# Patient Record
Sex: FEMALE | Race: WHITE | NOT HISPANIC OR LATINO | Employment: FULL TIME | URBAN - METROPOLITAN AREA
[De-identification: names, ages, dates, MRNs, and addresses within clinical notes are randomized per-mention and may not be internally consistent; named-entity substitution may affect disease eponyms.]

---

## 2018-12-26 ENCOUNTER — APPOINTMENT (EMERGENCY)
Dept: RADIOLOGY | Facility: HOSPITAL | Age: 53
DRG: 720 | End: 2018-12-26
Payer: COMMERCIAL

## 2018-12-26 ENCOUNTER — HOSPITAL ENCOUNTER (INPATIENT)
Facility: HOSPITAL | Age: 53
LOS: 2 days | Discharge: HOME | DRG: 720 | End: 2018-12-28
Attending: EMERGENCY MEDICINE | Admitting: STUDENT IN AN ORGANIZED HEALTH CARE EDUCATION/TRAINING PROGRAM
Payer: COMMERCIAL

## 2018-12-26 DIAGNOSIS — A41.9 SEPSIS, DUE TO UNSPECIFIED ORGANISM: ICD-10-CM

## 2018-12-26 DIAGNOSIS — J18.9 RIGHT LOWER LOBE PNEUMONIA: Primary | ICD-10-CM

## 2018-12-26 DIAGNOSIS — J18.9 COMMUNITY ACQUIRED PNEUMONIA OF RIGHT LOWER LOBE OF LUNG: ICD-10-CM

## 2018-12-26 PROBLEM — F41.9 ANXIETY: Status: ACTIVE | Noted: 2018-12-26

## 2018-12-26 PROBLEM — R10.9 ABDOMINAL PAIN: Status: ACTIVE | Noted: 2018-12-26

## 2018-12-26 LAB
ALBUMIN SERPL BCP-MCNC: 4 G/DL (ref 3.5–5)
ALP SERPL-CCNC: 118 U/L (ref 46–116)
ALT SERPL W P-5'-P-CCNC: 62 U/L (ref 12–78)
ANION GAP SERPL CALCULATED.3IONS-SCNC: 9 MMOL/L (ref 4–13)
APTT PPP: 24 SECONDS (ref 26–38)
AST SERPL W P-5'-P-CCNC: 43 U/L (ref 5–45)
ATRIAL RATE: 115 BPM
BACTERIA UR QL AUTO: ABNORMAL /HPF
BASOPHILS # BLD AUTO: 0.03 THOUSANDS/ΜL (ref 0–0.1)
BASOPHILS NFR BLD AUTO: 0 % (ref 0–1)
BILIRUB SERPL-MCNC: 0.6 MG/DL (ref 0.2–1)
BILIRUB UR QL STRIP: NEGATIVE
BUN SERPL-MCNC: 14 MG/DL (ref 5–25)
CALCIUM SERPL-MCNC: 9.4 MG/DL (ref 8.3–10.1)
CHLORIDE SERPL-SCNC: 103 MMOL/L (ref 100–108)
CLARITY UR: CLEAR
CO2 SERPL-SCNC: 29 MMOL/L (ref 21–32)
COLOR UR: YELLOW
CREAT SERPL-MCNC: 0.92 MG/DL (ref 0.6–1.3)
EOSINOPHIL # BLD AUTO: 0 THOUSAND/ΜL (ref 0–0.61)
EOSINOPHIL NFR BLD AUTO: 0 % (ref 0–6)
ERYTHROCYTE [DISTWIDTH] IN BLOOD BY AUTOMATED COUNT: 12.5 % (ref 11.6–15.1)
FLUAV AG SPEC QL IA: NORMAL
FLUAV AG SPEC QL: NORMAL
FLUBV AG SPEC QL IA: NORMAL
FLUBV AG SPEC QL: NORMAL
GFR SERPL CREATININE-BSD FRML MDRD: 71 ML/MIN/1.73SQ M
GLUCOSE SERPL-MCNC: 130 MG/DL (ref 65–140)
GLUCOSE UR STRIP-MCNC: NEGATIVE MG/DL
HCT VFR BLD AUTO: 47.6 % (ref 34.8–46.1)
HGB BLD-MCNC: 15.2 G/DL (ref 11.5–15.4)
HGB UR QL STRIP.AUTO: ABNORMAL
IMM GRANULOCYTES # BLD AUTO: 0.03 THOUSAND/UL (ref 0–0.2)
IMM GRANULOCYTES NFR BLD AUTO: 0 % (ref 0–2)
INR PPP: 0.97 (ref 0.86–1.16)
KETONES UR STRIP-MCNC: NEGATIVE MG/DL
L PNEUMO1 AG UR QL IA.RAPID: NEGATIVE
LACTATE SERPL-SCNC: 1.1 MMOL/L (ref 0.5–2)
LACTATE SERPL-SCNC: 1.3 MMOL/L (ref 0.5–2)
LACTATE SERPL-SCNC: 2.1 MMOL/L (ref 0.5–2)
LEUKOCYTE ESTERASE UR QL STRIP: NEGATIVE
LYMPHOCYTES # BLD AUTO: 1.3 THOUSANDS/ΜL (ref 0.6–4.47)
LYMPHOCYTES NFR BLD AUTO: 13 % (ref 14–44)
MCH RBC QN AUTO: 29.9 PG (ref 26.8–34.3)
MCHC RBC AUTO-ENTMCNC: 31.9 G/DL (ref 31.4–37.4)
MCV RBC AUTO: 94 FL (ref 82–98)
MONOCYTES # BLD AUTO: 0.26 THOUSAND/ΜL (ref 0.17–1.22)
MONOCYTES NFR BLD AUTO: 3 % (ref 4–12)
NEUTROPHILS # BLD AUTO: 8.57 THOUSANDS/ΜL (ref 1.85–7.62)
NEUTS SEG NFR BLD AUTO: 84 % (ref 43–75)
NITRITE UR QL STRIP: NEGATIVE
NON-SQ EPI CELLS URNS QL MICRO: ABNORMAL /HPF
NRBC BLD AUTO-RTO: 0 /100 WBCS
P AXIS: 14 DEGREES
PH UR STRIP.AUTO: 5.5 [PH] (ref 5–9)
PLATELET # BLD AUTO: 181 THOUSANDS/UL (ref 149–390)
PLATELET # BLD AUTO: 275 THOUSANDS/UL (ref 149–390)
PMV BLD AUTO: 9.4 FL (ref 8.9–12.7)
PMV BLD AUTO: 9.4 FL (ref 8.9–12.7)
POTASSIUM SERPL-SCNC: 4 MMOL/L (ref 3.5–5.3)
PR INTERVAL: 144 MS
PROCALCITONIN SERPL-MCNC: <0.05 NG/ML
PROT SERPL-MCNC: 7.8 G/DL (ref 6.4–8.2)
PROT UR STRIP-MCNC: NEGATIVE MG/DL
PROTHROMBIN TIME: 10.2 SECONDS (ref 9.4–11.7)
QRS AXIS: 30 DEGREES
QRSD INTERVAL: 88 MS
QT INTERVAL: 338 MS
QTC INTERVAL: 467 MS
RBC # BLD AUTO: 5.08 MILLION/UL (ref 3.81–5.12)
RBC #/AREA URNS AUTO: ABNORMAL /HPF
RSV B RNA SPEC QL NAA+PROBE: NORMAL
S PNEUM AG UR QL: NEGATIVE
SODIUM SERPL-SCNC: 141 MMOL/L (ref 136–145)
SP GR UR STRIP.AUTO: >=1.03 (ref 1–1.03)
T WAVE AXIS: 56 DEGREES
UROBILINOGEN UR QL STRIP.AUTO: 0.2 E.U./DL
VENTRICULAR RATE: 115 BPM
WBC # BLD AUTO: 10.19 THOUSAND/UL (ref 4.31–10.16)
WBC #/AREA URNS AUTO: ABNORMAL /HPF

## 2018-12-26 PROCEDURE — 87040 BLOOD CULTURE FOR BACTERIA: CPT | Performed by: EMERGENCY MEDICINE

## 2018-12-26 PROCEDURE — 93005 ELECTROCARDIOGRAM TRACING: CPT

## 2018-12-26 PROCEDURE — 80053 COMPREHEN METABOLIC PANEL: CPT | Performed by: EMERGENCY MEDICINE

## 2018-12-26 PROCEDURE — 87449 NOS EACH ORGANISM AG IA: CPT | Performed by: STUDENT IN AN ORGANIZED HEALTH CARE EDUCATION/TRAINING PROGRAM

## 2018-12-26 PROCEDURE — 87633 RESP VIRUS 12-25 TARGETS: CPT | Performed by: STUDENT IN AN ORGANIZED HEALTH CARE EDUCATION/TRAINING PROGRAM

## 2018-12-26 PROCEDURE — 87631 RESP VIRUS 3-5 TARGETS: CPT | Performed by: EMERGENCY MEDICINE

## 2018-12-26 PROCEDURE — 36415 COLL VENOUS BLD VENIPUNCTURE: CPT | Performed by: EMERGENCY MEDICINE

## 2018-12-26 PROCEDURE — 83605 ASSAY OF LACTIC ACID: CPT | Performed by: EMERGENCY MEDICINE

## 2018-12-26 PROCEDURE — 71045 X-RAY EXAM CHEST 1 VIEW: CPT

## 2018-12-26 PROCEDURE — 87081 CULTURE SCREEN ONLY: CPT | Performed by: INTERNAL MEDICINE

## 2018-12-26 PROCEDURE — 81001 URINALYSIS AUTO W/SCOPE: CPT | Performed by: EMERGENCY MEDICINE

## 2018-12-26 PROCEDURE — 99285 EMERGENCY DEPT VISIT HI MDM: CPT

## 2018-12-26 PROCEDURE — 93010 ELECTROCARDIOGRAM REPORT: CPT | Performed by: INTERNAL MEDICINE

## 2018-12-26 PROCEDURE — 83605 ASSAY OF LACTIC ACID: CPT | Performed by: STUDENT IN AN ORGANIZED HEALTH CARE EDUCATION/TRAINING PROGRAM

## 2018-12-26 PROCEDURE — 84145 PROCALCITONIN (PCT): CPT | Performed by: STUDENT IN AN ORGANIZED HEALTH CARE EDUCATION/TRAINING PROGRAM

## 2018-12-26 PROCEDURE — 85049 AUTOMATED PLATELET COUNT: CPT | Performed by: STUDENT IN AN ORGANIZED HEALTH CARE EDUCATION/TRAINING PROGRAM

## 2018-12-26 PROCEDURE — 85730 THROMBOPLASTIN TIME PARTIAL: CPT | Performed by: EMERGENCY MEDICINE

## 2018-12-26 PROCEDURE — 85610 PROTHROMBIN TIME: CPT | Performed by: EMERGENCY MEDICINE

## 2018-12-26 PROCEDURE — 99222 1ST HOSP IP/OBS MODERATE 55: CPT | Performed by: STUDENT IN AN ORGANIZED HEALTH CARE EDUCATION/TRAINING PROGRAM

## 2018-12-26 PROCEDURE — 85025 COMPLETE CBC W/AUTO DIFF WBC: CPT | Performed by: EMERGENCY MEDICINE

## 2018-12-26 RX ORDER — ACETAMINOPHEN 325 MG/1
650 TABLET ORAL ONCE
Status: COMPLETED | OUTPATIENT
Start: 2018-12-26 | End: 2018-12-26

## 2018-12-26 RX ORDER — ONDANSETRON 2 MG/ML
4 INJECTION INTRAMUSCULAR; INTRAVENOUS EVERY 6 HOURS PRN
Status: DISCONTINUED | OUTPATIENT
Start: 2018-12-26 | End: 2018-12-28 | Stop reason: HOSPADM

## 2018-12-26 RX ORDER — ACETAMINOPHEN 325 MG/1
650 TABLET ORAL EVERY 6 HOURS PRN
Status: DISCONTINUED | OUTPATIENT
Start: 2018-12-26 | End: 2018-12-28 | Stop reason: HOSPADM

## 2018-12-26 RX ORDER — CEFTRIAXONE 1 G/50ML
1000 INJECTION, SOLUTION INTRAVENOUS ONCE
Status: COMPLETED | OUTPATIENT
Start: 2018-12-26 | End: 2018-12-26

## 2018-12-26 RX ORDER — HEPARIN SODIUM 5000 [USP'U]/ML
5000 INJECTION, SOLUTION INTRAVENOUS; SUBCUTANEOUS EVERY 8 HOURS SCHEDULED
Status: DISCONTINUED | OUTPATIENT
Start: 2018-12-26 | End: 2018-12-26

## 2018-12-26 RX ORDER — ONDANSETRON 2 MG/ML
4 INJECTION INTRAMUSCULAR; INTRAVENOUS ONCE
Status: COMPLETED | OUTPATIENT
Start: 2018-12-26 | End: 2018-12-26

## 2018-12-26 RX ORDER — VENLAFAXINE HYDROCHLORIDE 150 MG/1
150 CAPSULE, EXTENDED RELEASE ORAL DAILY
Status: DISCONTINUED | OUTPATIENT
Start: 2018-12-26 | End: 2018-12-28 | Stop reason: HOSPADM

## 2018-12-26 RX ORDER — VENLAFAXINE HYDROCHLORIDE 150 MG/1
150 CAPSULE, EXTENDED RELEASE ORAL DAILY
COMMUNITY

## 2018-12-26 RX ORDER — BENZONATATE 100 MG/1
100 CAPSULE ORAL 3 TIMES DAILY PRN
Status: DISCONTINUED | OUTPATIENT
Start: 2018-12-26 | End: 2018-12-28 | Stop reason: HOSPADM

## 2018-12-26 RX ORDER — CEFTRIAXONE 1 G/50ML
1000 INJECTION, SOLUTION INTRAVENOUS EVERY 24 HOURS
Status: DISCONTINUED | OUTPATIENT
Start: 2018-12-27 | End: 2018-12-28 | Stop reason: HOSPADM

## 2018-12-26 RX ORDER — AZITHROMYCIN 250 MG/1
500 TABLET, FILM COATED ORAL EVERY 24 HOURS
Status: DISCONTINUED | OUTPATIENT
Start: 2018-12-27 | End: 2018-12-28 | Stop reason: HOSPADM

## 2018-12-26 RX ADMIN — SODIUM CHLORIDE 1000 ML: 0.9 INJECTION, SOLUTION INTRAVENOUS at 08:28

## 2018-12-26 RX ADMIN — SODIUM CHLORIDE 1000 ML: 0.9 INJECTION, SOLUTION INTRAVENOUS at 09:34

## 2018-12-26 RX ADMIN — VENLAFAXINE HYDROCHLORIDE 150 MG: 150 CAPSULE, EXTENDED RELEASE ORAL at 09:39

## 2018-12-26 RX ADMIN — SODIUM CHLORIDE 1000 ML: 0.9 INJECTION, SOLUTION INTRAVENOUS at 06:12

## 2018-12-26 RX ADMIN — ACETAMINOPHEN 650 MG: 325 TABLET, FILM COATED ORAL at 05:45

## 2018-12-26 RX ADMIN — CEFTRIAXONE 1000 MG: 1 INJECTION, SOLUTION INTRAVENOUS at 05:44

## 2018-12-26 RX ADMIN — AZITHROMYCIN MONOHYDRATE 500 MG: 500 INJECTION, POWDER, LYOPHILIZED, FOR SOLUTION INTRAVENOUS at 05:58

## 2018-12-26 RX ADMIN — HEPARIN SODIUM 5000 UNITS: 5000 INJECTION, SOLUTION INTRAVENOUS; SUBCUTANEOUS at 09:40

## 2018-12-26 RX ADMIN — ONDANSETRON 4 MG: 2 INJECTION INTRAMUSCULAR; INTRAVENOUS at 05:45

## 2018-12-26 NOTE — PROGRESS NOTES
Heparin given at 0940, next dose ordered for 1400  Order reads every 8 hours  Asked pharmacy to adjust dose, they did not want patient to be woken up for shot in the middle of the night, spoke with Dr Maryan Duong, he okayed to hold dose till 2200 to get patient back on a 0600, 1400, 2200 schedule

## 2018-12-26 NOTE — ED PROVIDER NOTES
History  Chief Complaint   Patient presents with    Chills     pt states woke up with chills, cough and chest tightness 1 hour ago, also c/o nausea and vomiting    Cough     78-year-old female presents with complaints of chills, cough, nausea vomiting and chest tightness that started approximately 1 hour prior to arrival   No fever, no rash, no recent travel outside the [place], no chest pain, no palpitations, no leg swelling, no calf tenderness  History provided by:  Patient  Cough   Cough characteristics:  Productive  Sputum characteristics:  Nondescript  Severity:  Moderate  Duration:  1 hour  Timing:  Intermittent  Progression:  Unchanged  Chronicity:  New  Smoker: no    Context: not sick contacts    Relieved by:  None tried  Worsened by: Activity  Ineffective treatments:  None tried  Associated symptoms: chills and sinus congestion    Associated symptoms: no chest pain, no diaphoresis, no fever, no myalgias, no shortness of breath, no sore throat and no wheezing        Prior to Admission Medications   Prescriptions Last Dose Informant Patient Reported? Taking?   venlafaxine (EFFEXOR-XR) 150 mg 24 hr capsule 2018 at Unknown time  Yes Yes   Sig: Take 150 mg by mouth daily      Facility-Administered Medications: None       Past Medical History:   Diagnosis Date    Anxiety     Psychiatric disorder        Past Surgical History:   Procedure Laterality Date     SECTION      HIP SURGERY         History reviewed  No pertinent family history  I have reviewed and agree with the history as documented  Social History   Substance Use Topics    Smoking status: Never Smoker    Smokeless tobacco: Never Used    Alcohol use No        Review of Systems   Constitutional: Positive for chills  Negative for diaphoresis and fever  HENT: Negative  Negative for sore throat  Eyes: Negative  Respiratory: Positive for cough and chest tightness   Negative for shortness of breath, wheezing and stridor  Cardiovascular: Negative for chest pain, palpitations and leg swelling  Gastrointestinal: Negative  Negative for diarrhea, nausea and vomiting  Genitourinary: Negative  Musculoskeletal: Negative  Negative for myalgias  Skin: Negative  Neurological: Negative  Hematological: Negative  Psychiatric/Behavioral: Negative  All other systems reviewed and are negative  Physical Exam  Physical Exam   Constitutional: She is oriented to person, place, and time  She appears well-developed and well-nourished  HENT:   Head: Normocephalic and atraumatic  Right Ear: External ear normal    Left Ear: External ear normal    Nose: Nose normal    Mouth/Throat: Oropharynx is clear and moist    Eyes: Conjunctivae and EOM are normal    Neck: Normal range of motion  Neck supple  Cardiovascular: Normal rate, regular rhythm, normal heart sounds and intact distal pulses  Pulmonary/Chest: Effort normal  No respiratory distress  She has decreased breath sounds in the right lower field  Abdominal: Soft  Bowel sounds are normal    Musculoskeletal: Normal range of motion  Neurological: She is alert and oriented to person, place, and time  Skin: Skin is warm and dry  Capillary refill takes less than 2 seconds  Psychiatric: She has a normal mood and affect  Her behavior is normal  Judgment and thought content normal    Nursing note and vitals reviewed        Vital Signs  ED Triage Vitals [12/26/18 0504]   Temperature Pulse Respirations Blood Pressure SpO2   (!) 101 °F (38 3 °C) (!) 121 (!) 24 164/79 95 %      Temp Source Heart Rate Source Patient Position - Orthostatic VS BP Location FiO2 (%)   Tympanic Monitor Sitting Right arm --      Pain Score       No Pain           Vitals:    12/28/18 0738 12/28/18 0800 12/28/18 1332 12/28/18 1345   BP:  132/76  144/81   Pulse: 95 101 104 (!) 106   Patient Position - Orthostatic VS:  Sitting  Lying       Visual Acuity      ED Medications  Medications ondansetron (ZOFRAN) injection 4 mg (4 mg Intravenous Given 12/26/18 0545)   acetaminophen (TYLENOL) tablet 650 mg (650 mg Oral Given 12/26/18 0545)   cefTRIAXone (ROCEPHIN) IVPB (premix) 1,000 mg (0 mg Intravenous Stopped 12/26/18 0558)   sodium chloride 0 9 % bolus 1,000 mL (1,000 mL Intravenous New Bag 12/26/18 0612)   sodium chloride 0 9 % bolus 1,000 mL (1,000 mL Intravenous New Bag 12/26/18 0828)     Followed by   sodium chloride 0 9 % bolus 1,000 mL (1,000 mL Intravenous New Bag 12/26/18 0934)   potassium chloride (K-DUR,KLOR-CON) CR tablet 40 mEq (40 mEq Oral Given 12/28/18 0950)       Diagnostic Studies  Results Reviewed     Procedure Component Value Units Date/Time    Blood culture #1 [[id number]] Collected:  12/26/18 0518    Lab Status:  Final result Specimen:  Blood from Arm, Right Updated:  12/31/18 1101     Blood Culture No Growth After 5 Days  Blood culture #2 [[id number]] Collected:  12/26/18 0535    Lab Status:  Final result Specimen:  Blood from Arm, Left Updated:  12/31/18 1101     Blood Culture No Growth After 5 Days      Respiratory Pathogen Profile, PCR [[id number]]  (Normal) Collected:  12/26/18 0937    Lab Status:  Final result Specimen:  Nasopharyngeal from Nasopharyngeal Swab Updated:  12/27/18 1108     INFLU A/MATRIX GENE Not Detected     Influenza A/H1 Not Detected     Influenza A/H3 Not Detected     INFLUENZA B Not Detected     RSV A Not Detected     RSV B Not Detected     Coronavirus 229E Not Detected     Coronavirus OC43 Not Detected     Coronavirus NL63 Not Detected     Coronavirus HKU1 Not Detected     METAPNEUMOVIRUS Not Detected     RHINOVIRUS Not Detected     ADENOVIRUS Not Detected     PARAINFLUENZA 1 Not Detected     PARAINFLUENZA 2 Not Detected     PARAINFLUENZA 3 Not Detected     Parainfluenza 4 Not Detected     Human Bocavirus Not Detected     Chlamydophila pneumoniae Not Detected     Mycoplasma pneumoniae Not Detected    INFLUENZA A/B AND RSV, PCR [[id number]]  (Normal) Collected:  12/26/18 0526    Lab Status:  Final result Specimen:  Nasopharyngeal from Nasopharyngeal Swab Updated:  12/26/18 1344     INFLU A PCR None Detected     INFLU B PCR None Detected     RSV PCR None Detected    Legionella antigen, urine [[id number]]  (Normal) Collected:  12/26/18 0803    Lab Status:  Final result Specimen:  Urine from Urine, Clean Catch Updated:  12/26/18 1229     Legionella Urinary Antigen Negative    Strep Pneumoniae, Urine [[id number]]  (Normal) Collected:  12/26/18 0803    Lab Status:  Final result Specimen:  Urine from Urine, Clean Catch Updated:  12/26/18 1228     Strep pneumoniae antigen, urine Negative    Procalcitonin [[id number]]  (Normal) Collected:  12/26/18 0518    Lab Status:  Final result Specimen:  Blood Updated:  12/26/18 0954     Procalcitonin <0 05 ng/ml     Lactic Acid STAT and in 2 hours if first result greater than 2 [[id number]]  (Normal) Collected:  12/26/18 0846    Lab Status:  Final result Specimen:  Blood from Hand, Right Updated:  12/26/18 0920     LACTIC ACID 1 1 mmol/L     Narrative:         Result may be elevated if tourniquet was used during collection  Lactic Acid x2 [[id number]]  (Normal) Collected:  12/26/18 0751    Lab Status:  Final result Specimen:  Blood from Arm, Left Updated:  12/26/18 0830     LACTIC ACID 1 3 mmol/L     Narrative:         Result may be elevated if tourniquet was used during collection      UA w Reflex to Microscopic w Reflex to Culture [[id number]]  (Abnormal) Collected:  12/26/18 0800    Lab Status:  Final result Specimen:  Urine from Urine, Clean Catch Updated:  12/26/18 0819     Color, UA Yellow     Clarity, UA Clear     Specific Gravity, UA >=1 030     pH, UA 5 5     Leukocytes, UA Negative     Nitrite, UA Negative     Protein, UA Negative mg/dl      Glucose, UA Negative mg/dl      Ketones, UA Negative mg/dl      Urobilinogen, UA 0 2 E U /dl      Bilirubin, UA Negative     Blood, UA Trace-Intact (A)    Rapid Influenza Screen with Reflex PCR [[id number]]  (Normal) Collected:  12/26/18 0526    Lab Status:  Final result Specimen:  Nasopharyngeal from Nasopharyngeal Swab Updated:  12/26/18 0654     Rapid Influenza A Ag Indeterminate     Rapid Influenza B Ag Indeterminate    Comprehensive metabolic panel [[id number]]  (Abnormal) Collected:  12/26/18 0518    Lab Status:  Final result Specimen:  Blood from Arm, Right Updated:  12/26/18 0600     Sodium 141 mmol/L      Potassium 4 0 mmol/L      Chloride 103 mmol/L      CO2 29 mmol/L      ANION GAP 9 mmol/L      BUN 14 mg/dL      Creatinine 0 92 mg/dL      Glucose 130 mg/dL      Calcium 9 4 mg/dL      AST 43 U/L      ALT 62 U/L      Alkaline Phosphatase 118 (H) U/L      Total Protein 7 8 g/dL      Albumin 4 0 g/dL      Total Bilirubin 0 60 mg/dL      eGFR 71 ml/min/1 73sq m     Narrative:         National Kidney Disease Education Program recommendations are as follows:  GFR calculation is accurate only with a steady state creatinine  Chronic Kidney disease less than 60 ml/min/1 73 sq  meters  Kidney failure less than 15 ml/min/1 73 sq  meters  Lactic Acid x2 [[id number]]  (Abnormal) Collected:  12/26/18 0518    Lab Status:  Final result Specimen:  Blood from Arm, Right Updated:  12/26/18 0551     LACTIC ACID 2 1 (HH) mmol/L     Narrative:         Result may be elevated if tourniquet was used during collection      APTT [[id number]]  (Abnormal) Collected:  12/26/18 0518    Lab Status:  Final result Specimen:  Blood from Arm, Right Updated:  12/26/18 0547     PTT 24 (L) seconds     Protime-INR [[id number]]  (Normal) Collected:  12/26/18 0518    Lab Status:  Final result Specimen:  Blood from Arm, Right Updated:  12/26/18 0547     Protime 10 2 seconds      INR 0 97    CBC and differential [[id number]]  (Abnormal) Collected:  12/26/18 0518    Lab Status:  Final result Specimen:  Blood from Arm, Right Updated:  12/26/18 0529     WBC 10 19 (H) Thousand/uL      RBC 5 08 Million/uL      Hemoglobin 15 2 g/dL Hematocrit 47 6 (H) %      MCV 94 fL      MCH 29 9 pg      MCHC 31 9 g/dL      RDW 12 5 %      MPV 9 4 fL      Platelets 477 Thousands/uL      nRBC 0 /100 WBCs      Neutrophils Relative 84 (H) %      Immat GRANS % 0 %      Lymphocytes Relative 13 (L) %      Monocytes Relative 3 (L) %      Eosinophils Relative 0 %      Basophils Relative 0 %      Neutrophils Absolute 8 57 (H) Thousands/µL      Immature Grans Absolute 0 03 Thousand/uL      Lymphocytes Absolute 1 30 Thousands/µL      Monocytes Absolute 0 26 Thousand/µL      Eosinophils Absolute 0 00 Thousand/µL      Basophils Absolute 0 03 Thousands/µL                  XR chest portable   Final Result by Keira Nathan MD (12/28 3354)      Previously described infiltrates in the right lung are almost completely resolved  Workstation performed: [id number]         X-ray chest 1 view portable   Final Result by Patti Moreland MD (02/26 4319)      Consolidation throughout the right lung, predominantly in the right lower lung field, most concerning for pneumonia  Follow-up to resolution suggested              Workstation performed: [id number]                    Procedures  Procedures       Phone Contacts  ED Phone Contact    ED Course                               MDM  CritCare Time    Disposition  Final diagnoses:   Right lower lobe pneumonia ([address] )     Time reflects when diagnosis was documented in both MDM as applicable and the Disposition within this note     Time User Action Codes Description Comment    12/26/2018  5:49 AM Radha Peals Add [J18 1] Right lower lobe pneumonia ([address] )     12/28/2018  2:31 PM Roderick Jane Add [J18 1] Community acquired pneumonia of right lower lobe of lung ([address] )     12/28/2018  2:58 PM Roderick Jane Add [A41 9] Sepsis, due to unspecified organism [facility])       ED Disposition     ED Disposition Condition Comment    Admit  Case was discussed with the hospitalist and the patient's admission status was agreed to be Admission Status: inpatient status to the service of Dr Adler Has           Follow-up Information     Follow up With Specialties Details Why Contact Info    Shiloh Yusuf MD  Follow up in 1 week(s)  [address] [facility]  [address]  [address]  [address] [place] [address], DO Pulmonology, Critical Care Medicine, Neurology, Pulmonary Disease Follow up  [address] [place] [address]  [phone]            Discharge Medication List as of 12/28/2018  2:35 PM      START taking these medications    Details   albuterol (PROVENTIL HFA,VENTOLIN HFA) 90 mcg/act inhaler Inhale 2 puffs every 6 (six) hours as needed for wheezing for up to 30 days, Starting Fri 12/28/2018, Until Sun 1/27/2019, Normal      cefpodoxime (VANTIN) 200 mg tablet Take 1 tablet (200 mg total) by mouth 2 (two) times a day for 4 days, Starting Fri 12/28/2018, Until Tue 1/1/2019, Normal         CONTINUE these medications which have NOT CHANGED    Details   venlafaxine (EFFEXOR-XR) 150 mg 24 hr capsule Take 150 mg by mouth daily, Historical Med             Outpatient Discharge Orders  Discharge Diet     Activity as tolerated         ED Provider  Electronically Signed by           Yuan Hendricks MD  01/07/19 1443

## 2018-12-26 NOTE — LETTER
[address] [facility] [id number]  Dept: [phone]    December 28, 2018     Patient: Sakina Mejia   YOB: 1965   Date of Visit: 12/26/2018       To Whom it May Concern:    Eleazar Anna is under my professional care  She was seen in the hospital from 12/26/2018   to 12/28/18  She may return to work on 1/2/19  If you have any questions or concerns, please don't hesitate to call           Sincerely,          Beck Blackmon MD

## 2018-12-26 NOTE — H&P
History and Physical - [facility] Internal Medicine    Patient Information: Iliana Mendiola 46 y o  female MRN: [id number]  Unit/Bed#: ED 07 Encounter: [id number]  Admitting Physician: Adrian Rainey MD  PCP: Patricia Alcantara MD  Date of Admission:  18    Chief Complaint:     Cough, fever, chills, shortness of breath   of Present Illness:    Iliana Mendiola is a 46 y o  female with a PMH of Anxiety who presents with cough, fever, chills and shortness of breath  She states that she was in her usual state of health until several hours ago when she woke up feeling feverish with chills  She proceeded to have a persistent cough with shortness of breath  She also began to have lower abdominal pain with nausea and an episode of vomiting  In the ED she was found to have a right sided pneumonia  Currently she reports mild shortness of breath  She denies any chest pain though does report some chest tightness which she attributes to her coughing and shortness of breath  She also reports lower abdominal pain  She denies any diarrhea, blood in her stools or melena  She denies any recent hospitalizations  No other complaints or concerns  Review of Systems:    Review of Systems   Constitutional: Positive for chills and fever  Respiratory: Positive for cough and shortness of breath  Cardiovascular: Negative for chest pain  Gastrointestinal: Positive for abdominal pain, nausea and vomiting  Negative for blood in stool and diarrhea  Genitourinary: Negative for hematuria  Musculoskeletal: Positive for arthralgias  Neurological: Negative for syncope  Psychiatric/Behavioral: Negative for confusion         Past Medical and Surgical History:     Past Medical History:   Diagnosis Date    Anxiety     Psychiatric disorder        Past Surgical History:   Procedure Laterality Date     SECTION      HIP SURGERY         Meds/Allergies:    PTA meds:   Prior to Admission Medications   Prescriptions Last Dose Informant Patient Reported? Taking?   venlafaxine (EFFEXOR-XR) 150 mg 24 hr capsule   Yes Yes   Sig: Take 150 mg by mouth daily      Facility-Administered Medications: None       Allergies: No Known Allergies  History:     Marital Status: Legally    History   Alcohol Use No     History   Smoking Status    Never Smoker   Smokeless Tobacco    Not on file     History   Drug Use No       Family History: Mother: healthy   Father: Colon Cancer    Physical Exam:     Vitals:   Blood Pressure: 133/70 (12/26/18 0615)  Pulse: (!) 106 (12/26/18 0615)  Temperature: (!) 101 °F (38 3 °C) (12/26/18 0504)  Temp Source: Tympanic (12/26/18 0504)  Respirations: (!) 24 (12/26/18 0615)  SpO2: 92 % (12/26/18 0615)    Physical Exam:   General: in no acute distress  HEENT: atraumatic, normocephalic  Skin: no jaundice  CVS: tachycardic, no murmurs appreciated  Lungs: rhonchi with crackles appreciated throughout right lung, no wheezing   Abdomen: soft, nondistended, bowel sounds normal, mild lower abdominal tenderness upon palpation  Extremities: no edema, no calf swelling or tenderness  Neuro: alert and oriented x3  Psych: calm, cooperative      Lab Results: I have personally reviewed pertinent reports  Results from last 7 days  Lab Units 12/26/18  0518   WBC Thousand/uL 10 19*   HEMOGLOBIN g/dL 15 2   HEMATOCRIT % 47 6*   PLATELETS Thousands/uL 275   NEUTROS PCT % 84*   LYMPHS PCT % 13*   MONOS PCT % 3*   EOS PCT % 0       Results from last 7 days  Lab Units 12/26/18  0518   POTASSIUM mmol/L 4 0   CHLORIDE mmol/L 103   CO2 mmol/L 29   BUN mg/dL 14   CREATININE mg/dL 0 92   CALCIUM mg/dL 9 4   ALK PHOS U/L 118*   ALT U/L 62   AST U/L 43       Results from last 7 days  Lab Units 12/26/18  0518   INR  0 97       Imaging:     No results found  Assessment/Plan    * Sepsis ([facility] [address] )   Assessment & Plan    POA- as evidenced by fever, tachycardia, leukocytosis, lactic acidosis and CAP    She was given Ceftriaxone and Azithromycin in the ED  Will resume this regimen, order urine strep, urine legionella, Influenza panel and follow up on blood cultures  Will trend LA until it normalizes      Anxiety   Assessment & Plan    Resume Effexor      Abdominal pain with nausea and vomiting    Assessment & Plan    Denies any diarrhea  Alkaline phosphatase is mildly elevated  Will order Zofran prn and repeat CMP tomorrow      CAP (community acquired pneumonia)   Assessment & Plan    Refer to above          Hospital Problem List:     Principal Problem:    Sepsis ([address] )  Active Problems:    CAP (community acquired pneumonia)    Abdominal pain with nausea and vomiting     Anxiety        VTE Prophylaxis: Heparin   Code Status: No Order    Anticipated Length of Stay:  Patient will be admitted on an Inpatient basis with an anticipated length of stay of atleast 2 midnights  Total Time for Visit, including Counseling / Coordination of Care: 30 minutes  Greater than 50% of this total time spent on direct patient counseling and coordination of care

## 2018-12-26 NOTE — ASSESSMENT & PLAN NOTE
POA- as evidenced by fever, tachycardia, leukocytosis, lactic acidosis and CAP      Signs of sepsis have resolved  Continue IV Rocephin Zithromax  Patient's respiratory pathogen profile was negative  Urine for Legionella and pneumococcal antigens were negative  Patient noted to have extensive multilobar pneumonia on the right-Pulmonary was consulted

## 2018-12-26 NOTE — UTILIZATION REVIEW
Initial Clinical Review    Admission: Date/Time/Statement: 12/26/18 @ 0527     Orders Placed This Encounter   Procedures    Inpatient Admission (expected length of stay for this patient is greater than two midnights)     Standing Status:   Standing     Number of Occurrences:   1     Order Specific Question:   Admitting Physician     Answer:   Vincenzo Rodriguez [[id number]]     Order Specific Question:   Level of Care     Answer:   Med Surg [16]     Order Specific Question:   Estimated length of stay     Answer:   More than 2 Midnights     Order Specific Question:   Certification     Answer:   I certify that inpatient services are medically necessary for this patient for a duration of greater than two midnights  See H&P and MD Progress Notes for additional information about the patient's course of treatment  ED: Date/Time/Mode of Arrival:   ED Arrival Information     Expected Arrival Acuity Means of Arrival Escorted By Service Admission Type    - 12/26/2018 05:02 [facility] [address] Emergency    Arrival Complaint    flu like symptoms          Chief Complaint:   Chief Complaint   Patient presents with    Chills     pt states woke up with chills, cough and chest tightness 1 hour ago, also c/o nausea and vomiting    Cough       History of Illness: Dylan Carpenter is a 46 y o  female with a PMH of Anxiety who presents with cough, fever, chills and shortness of breath  She states that she was in her usual state of health until several hours ago when she woke up feeling feverish with chills  She proceeded to have a persistent cough with shortness of breath  She also began to have lower abdominal pain with nausea and an episode of vomiting  In the ED she was found to have a right sided pneumonia  Currently she reports mild shortness of breath  She denies any chest pain though does report some chest tightness which she attributes to her coughing and shortness of breath   She also reports lower abdominal pain  She denies any diarrhea, blood in her stools or melena  She denies any recent hospitalizations    ED Vital Signs:   ED Triage Vitals [12/26/18 0504]   Temperature Pulse Respirations Blood Pressure SpO2   (!) 101 °F (38 3 °C) (!) 121 (!) 24 164/79 95 %      Temp Source Heart Rate Source Patient Position - Orthostatic VS BP Location FiO2 (%)   Tympanic Monitor Sitting Right arm --      Pain Score       No Pain        Wt Readings from Last 1 Encounters:   12/26/18 93 3 kg (205 lb 11 oz)       Vital Signs (abnormal): Abnormal Labs/Diagnostic Test Results: WBC 10/19 HCT 47 6 ALK PHOS 118 LACTIC ACID 2 1 PTT 24  CXR Consolidation throughout the right lung, predominantly in the right lower lung field, most concerning for pneumonia  Follow-up to resolution suggested      ED Treatment:   Medication Administration from 12/26/2018 0502 to 12/26/2018 0380       Date/Time Order Dose Route Action Action by Comments     12/26/2018 0545 ondansetron (ZOFRAN) injection 4 mg 4 mg Intravenous Given Hetal Vale RN      12/26/2018 0545 acetaminophen (TYLENOL) tablet 650 mg 650 mg Oral Given Hetal Vale RN      12/26/2018 0558 cefTRIAXone (ROCEPHIN) IVPB (premix) 1,000 mg 0 mg Intravenous Stopped Hetal Vale RN      12/26/2018 0544 cefTRIAXone (ROCEPHIN) IVPB (premix) 1,000 mg 1,000 mg Intravenous [address] [facility] [address]      12/26/2018 0558 azithromycin (ZITHROMAX) 500 mg in sodium chloride 0 9% 250mL IVPB 500 mg 500 mg Intravenous [address] Hetal Vale RN      12/26/2018 9353 sodium chloride 0 9 % bolus 1,000 mL 1,000 mL Intravenous New Bag Hetal Vale RN           Past Medical/Surgical History:    Active Ambulatory Problems     Diagnosis Date Noted    No Active Ambulatory Problems     Resolved Ambulatory Problems     Diagnosis Date Noted    No Resolved Ambulatory Problems     Past Medical History:   Diagnosis Date    Anxiety     Psychiatric disorder        Admitting Diagnosis: Cough [R05]  Chills [R68 83]  Right lower lobe pneumonia ([facility] [address] ) [J18 1]    Age/Sex: 46 y o  female    Assessment/Plan:   Sepsis ([facility] [address] )   Assessment & Plan     POA- as evidenced by fever, tachycardia, leukocytosis, lactic acidosis and CAP  She was given Ceftriaxone and Azithromycin in the ED  Will resume this regimen, order urine strep, urine legionella, Influenza panel and follow up on blood cultures  Will trend LA until it normalizes    Anxiety   Assessment & Plan     Resume Effexor    Abdominal pain with nausea and vomiting    Assessment & Plan     Denies any diarrhea  Alkaline phosphatase is mildly elevated  Will order Zofran prn and repeat CMP tomorrow    CAP (community acquired pneumonia)   Assessment & Plan     Refer to above    VTE Prophylaxis: Heparin   Code Status: No Order  Anticipated Length of Stay:  Patient will be admitted on an Inpatient basis with an anticipated length of stay of atleast 2 midnights       Admission Orders:  Scheduled Meds:   Current Facility-Administered Medications:  acetaminophen 650 mg Oral Q6H PRN Adrian Rainey MD   [START ON 12/27/2018] azithromycin 500 mg Oral Q24H Adrian Rainey MD   benzonatate 100 mg Oral TID PRN Adrian Rainey MD   Cal Crowell ON 12/27/2018] cefTRIAXone 1,000 mg Intravenous Q24H Adrian Rainey MD   heparin (porcine) 5,000 Units Subcutaneous Q8H [address] Adrian Rainey MD   ondansetron 4 mg Intravenous Q6H PRN Adrian Rainey MD   venlafaxine 150 mg Oral Daily Adrian Rainey MD     Continuous Infusions:    PRN Meds:   acetaminophen    benzonatate    ondansetron

## 2018-12-27 PROBLEM — J69.0 ASPIRATION PNEUMONITIS (HCC): Status: ACTIVE | Noted: 2018-12-27

## 2018-12-27 PROBLEM — Z87.19 HISTORY OF ESOPHAGEAL STRICTURE: Chronic | Status: ACTIVE | Noted: 2018-12-27

## 2018-12-27 LAB
ADENOVIRUS: NOT DETECTED
ALBUMIN SERPL BCP-MCNC: 2.9 G/DL (ref 3.5–5)
ALP SERPL-CCNC: 87 U/L (ref 46–116)
ALT SERPL W P-5'-P-CCNC: 59 U/L (ref 12–78)
ANION GAP SERPL CALCULATED.3IONS-SCNC: 8 MMOL/L (ref 4–13)
AST SERPL W P-5'-P-CCNC: 39 U/L (ref 5–45)
BASOPHILS # BLD AUTO: 0.03 THOUSANDS/ΜL (ref 0–0.1)
BASOPHILS NFR BLD AUTO: 0 % (ref 0–1)
BILIRUB SERPL-MCNC: 0.6 MG/DL (ref 0.2–1)
BUN SERPL-MCNC: 8 MG/DL (ref 5–25)
C PNEUM DNA SPEC QL NAA+PROBE: NOT DETECTED
CALCIUM SERPL-MCNC: 8.4 MG/DL (ref 8.3–10.1)
CHLORIDE SERPL-SCNC: 105 MMOL/L (ref 100–108)
CO2 SERPL-SCNC: 26 MMOL/L (ref 21–32)
CREAT SERPL-MCNC: 0.62 MG/DL (ref 0.6–1.3)
EOSINOPHIL # BLD AUTO: 0.02 THOUSAND/ΜL (ref 0–0.61)
EOSINOPHIL NFR BLD AUTO: 0 % (ref 0–6)
ERYTHROCYTE [DISTWIDTH] IN BLOOD BY AUTOMATED COUNT: 12.9 % (ref 11.6–15.1)
FLUAV H1 RNA SPEC QL NAA+PROBE: NOT DETECTED
FLUAV H3 RNA SPEC QL NAA+PROBE: NOT DETECTED
FLUAV RNA SPEC QL NAA+PROBE: NOT DETECTED
FLUBV RNA SPEC QL NAA+PROBE: NOT DETECTED
GFR SERPL CREATININE-BSD FRML MDRD: 103 ML/MIN/1.73SQ M
GLUCOSE SERPL-MCNC: 96 MG/DL (ref 65–140)
HBOV DNA SPEC QL NAA+PROBE: NOT DETECTED
HCOV 229E RNA SPEC QL NAA+PROBE: NOT DETECTED
HCOV HKU1 RNA SPEC QL NAA+PROBE: NOT DETECTED
HCOV NL63 RNA SPEC QL NAA+PROBE: NOT DETECTED
HCOV OC43 RNA SPEC QL NAA+PROBE: NOT DETECTED
HCT VFR BLD AUTO: 34.7 % (ref 34.8–46.1)
HGB BLD-MCNC: 11.2 G/DL (ref 11.5–15.4)
HPIV1 RNA SPEC QL NAA+PROBE: NOT DETECTED
HPIV2 RNA SPEC QL NAA+PROBE: NOT DETECTED
HPIV3 RNA SPEC QL NAA+PROBE: NOT DETECTED
HPIV4 RNA SPEC QL NAA+PROBE: NOT DETECTED
IMM GRANULOCYTES # BLD AUTO: 0.02 THOUSAND/UL (ref 0–0.2)
IMM GRANULOCYTES NFR BLD AUTO: 0 % (ref 0–2)
LYMPHOCYTES # BLD AUTO: 1.68 THOUSANDS/ΜL (ref 0.6–4.47)
LYMPHOCYTES NFR BLD AUTO: 19 % (ref 14–44)
M PNEUMO DNA SPEC QL NAA+PROBE: NOT DETECTED
MCH RBC QN AUTO: 30.5 PG (ref 26.8–34.3)
MCHC RBC AUTO-ENTMCNC: 32.3 G/DL (ref 31.4–37.4)
MCV RBC AUTO: 95 FL (ref 82–98)
METAPNEUMOVIRUS: NOT DETECTED
MONOCYTES # BLD AUTO: 0.4 THOUSAND/ΜL (ref 0.17–1.22)
MONOCYTES NFR BLD AUTO: 5 % (ref 4–12)
MRSA NOSE QL CULT: NORMAL
NEUTROPHILS # BLD AUTO: 6.61 THOUSANDS/ΜL (ref 1.85–7.62)
NEUTS SEG NFR BLD AUTO: 76 % (ref 43–75)
NRBC BLD AUTO-RTO: 0 /100 WBCS
PLATELET # BLD AUTO: 177 THOUSANDS/UL (ref 149–390)
PMV BLD AUTO: 9.8 FL (ref 8.9–12.7)
POTASSIUM SERPL-SCNC: 3.5 MMOL/L (ref 3.5–5.3)
PROCALCITONIN SERPL-MCNC: 4.37 NG/ML
PROT SERPL-MCNC: 6.1 G/DL (ref 6.4–8.2)
RBC # BLD AUTO: 3.67 MILLION/UL (ref 3.81–5.12)
RHINOVIRUS RNA SPEC QL NAA+PROBE: NOT DETECTED
RSV A RNA SPEC QL NAA+PROBE: NOT DETECTED
RSV B RNA SPEC QL NAA+PROBE: NOT DETECTED
SODIUM SERPL-SCNC: 139 MMOL/L (ref 136–145)
WBC # BLD AUTO: 8.76 THOUSAND/UL (ref 4.31–10.16)

## 2018-12-27 PROCEDURE — 80053 COMPREHEN METABOLIC PANEL: CPT | Performed by: STUDENT IN AN ORGANIZED HEALTH CARE EDUCATION/TRAINING PROGRAM

## 2018-12-27 PROCEDURE — 92610 EVALUATE SWALLOWING FUNCTION: CPT

## 2018-12-27 PROCEDURE — 99254 IP/OBS CNSLTJ NEW/EST MOD 60: CPT | Performed by: INTERNAL MEDICINE

## 2018-12-27 PROCEDURE — G8996 SWALLOW CURRENT STATUS: HCPCS

## 2018-12-27 PROCEDURE — 94640 AIRWAY INHALATION TREATMENT: CPT

## 2018-12-27 PROCEDURE — 94664 DEMO&/EVAL PT USE INHALER: CPT

## 2018-12-27 PROCEDURE — 85025 COMPLETE CBC W/AUTO DIFF WBC: CPT | Performed by: INTERNAL MEDICINE

## 2018-12-27 PROCEDURE — 84145 PROCALCITONIN (PCT): CPT | Performed by: INTERNAL MEDICINE

## 2018-12-27 PROCEDURE — G8997 SWALLOW GOAL STATUS: HCPCS

## 2018-12-27 PROCEDURE — 99232 SBSQ HOSP IP/OBS MODERATE 35: CPT | Performed by: INTERNAL MEDICINE

## 2018-12-27 PROCEDURE — 94760 N-INVAS EAR/PLS OXIMETRY 1: CPT

## 2018-12-27 RX ORDER — ALBUTEROL SULFATE 2.5 MG/3ML
2.5 SOLUTION RESPIRATORY (INHALATION)
Status: DISCONTINUED | OUTPATIENT
Start: 2018-12-27 | End: 2018-12-28 | Stop reason: HOSPADM

## 2018-12-27 RX ORDER — ALBUTEROL SULFATE 2.5 MG/3ML
2.5 SOLUTION RESPIRATORY (INHALATION) EVERY 6 HOURS PRN
Status: DISCONTINUED | OUTPATIENT
Start: 2018-12-27 | End: 2018-12-27

## 2018-12-27 RX ORDER — FAMOTIDINE 20 MG/1
20 TABLET, FILM COATED ORAL 2 TIMES DAILY
Status: DISCONTINUED | OUTPATIENT
Start: 2018-12-27 | End: 2018-12-28 | Stop reason: HOSPADM

## 2018-12-27 RX ORDER — ALBUTEROL SULFATE 2.5 MG/3ML
2.5 SOLUTION RESPIRATORY (INHALATION) EVERY 4 HOURS PRN
Status: DISCONTINUED | OUTPATIENT
Start: 2018-12-27 | End: 2018-12-28 | Stop reason: HOSPADM

## 2018-12-27 RX ADMIN — AZITHROMYCIN 500 MG: 250 TABLET, FILM COATED ORAL at 06:37

## 2018-12-27 RX ADMIN — VENLAFAXINE HYDROCHLORIDE 150 MG: 150 CAPSULE, EXTENDED RELEASE ORAL at 09:45

## 2018-12-27 RX ADMIN — FAMOTIDINE 20 MG: 20 TABLET ORAL at 18:22

## 2018-12-27 RX ADMIN — ALBUTEROL SULFATE 2.5 MG: 2.5 SOLUTION RESPIRATORY (INHALATION) at 13:25

## 2018-12-27 RX ADMIN — FAMOTIDINE 20 MG: 20 TABLET ORAL at 11:58

## 2018-12-27 RX ADMIN — CEFTRIAXONE 1000 MG: 1 INJECTION, SOLUTION INTRAVENOUS at 06:37

## 2018-12-27 RX ADMIN — ENOXAPARIN SODIUM 40 MG: 40 INJECTION SUBCUTANEOUS at 09:45

## 2018-12-27 RX ADMIN — ALBUTEROL SULFATE 2.5 MG: 2.5 SOLUTION RESPIRATORY (INHALATION) at 21:02

## 2018-12-27 NOTE — CONSULTS
Consultation - Pulmonary Medicine  Jewel Howell 46 y o  female MRN: [id number]  Unit/Bed#: 2 [facility] 0 Encounter: [id number]    Assessment/Plan:    Aspiration pneumonitis ([address] )   Assessment & Plan    -mild  -initiation of proton pump inhibitors for gastric acid reduction  -supportive care     CAP (community acquired pneumonia)   Assessment & Plan    -clinically this is either community-acquired or aspiration pneumonia  -the patient is doing well on current antibiotic regimen, therefore we can continue this  -if clinical worsening can broaden coverage to include anaerobic antibiotic coverage  -continue to trend for clinical and radiographic improvement  -we will need to follow up this patient in the outpatient setting for resolution of this extensive right-sided multi lobar pneumonia  -we will continue to trend procalcitonin levels for early antibiotic discontinuation  -can continue cefepime and azithromycin presumptively for total of 5 day course that this expected point  -I will order speech evaluation for suspect possibility of silent aspiration             Thank you for this consultation; we will be happy to follow with you     ______________________________________________________________________      History of Present Illness   Physician Requesting Consult: Isael Jenkins MD  Reason for Consult / Principal Problem:  Acute hypoxemic respiratory failure secondary to pneumonia  HX and PE limited by:     HPI:  Jewel Howell is a 46 y o  female  who presents with acute hypoxemia and pneumonia  Past medical history of anxiety, GERD, history of esophageal strictures with dilatations, hip replacement, and recurrent pneumonias  She elicits a history of waking up at approximately 03:00 o'clock in the morning on December 26, the day after [place], and a coughing fits as well as having fevers and chills    She noted that she was unable to control her coughing and therefore came to the hospital for evaluation, where she was found to be febrile, tachycardic, with an initial chest x-ray evaluation concerning for right-sided multi lobar pneumonia  Trina Ormond is a 67-year-old  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 who lives in [address] [place] [address] with her family  Other than day care, she otherwise reports no significant sick contacts  Of course, she has numerous contacts with multiple sick children on a daily basis, but none that she can recall have any severe illnesses that are infectious  She reports having felt fine up until the morning that she woke up at 03:00 with illness  Interestingly, she has a history of esophageal strictures which she had to have dilated twice in the past   Most recently she had esophageal dilatation performed by a physician at [facility] gastroenterology  Additionally she reports that she has a previous diagnosis of GERD, however, she only intermittently takes ranitidine as needed for symptoms of heartburn  She elicits no routine history coughing or choking with ingestion of solids or liquids  She also states that she has a history of pneumonias  She reports she has had pneumonia several times, and on further questioning, she reports that she is treated for pneumonia almost every year  She also reports, in fact, that 15 years ago she had pneumococcal pneumonia for which she was hospitalized for 4 days  He has not had any recent hospitalizations since that time nor exposure to chronically sick or ill people  Only surgeries or procedures are esophageal dilatation x2, history of hip replacement as a child due to hip dysplasia, C-sections during childbearing years  She has not seen nor does she follow up with pulmonology  She has never had any routine spirometry or sleep related studies        HPI    Smoking history:  Never smoked / secondhand smoke exposure as a child from father smoke  Occupational history:   worker times 25 years  Travel history:  No recent travel  Recent sick exposures a       Review of Systems   Constitutional: Positive for chills and fever  Negative for activity change, appetite change and fatigue  HENT: Negative for postnasal drip, rhinorrhea, sinus pain and sinus pressure  Eyes: Negative for visual disturbance  Respiratory: Positive for cough, shortness of breath and wheezing  Negative for apnea, chest tightness and stridor  Cardiovascular: Negative for chest pain and leg swelling  Gastrointestinal: Negative for diarrhea, nausea and vomiting  Endocrine: Negative for cold intolerance and heat intolerance  Musculoskeletal: Negative for arthralgias, back pain, joint swelling and myalgias  Skin: Negative for color change  Neurological: Negative for syncope and light-headedness  Hematological: Negative for adenopathy  Psychiatric/Behavioral: Negative for confusion and sleep disturbance  Past Medical/Surgical History  Past Medical History:   Diagnosis Date    Anxiety     Psychiatric disorder      Past Surgical History:   Procedure Laterality Date     SECTION      HIP SURGERY         Social History  History   Alcohol Use No     History   Drug Use No     History   Smoking Status    Never Smoker   Smokeless Tobacco    Never Used       Family History  History reviewed  No pertinent family history      Allergies  No Known Allergies    Home Meds:   Prescriptions Prior to Admission   Medication Sig Dispense Refill Last Dose    venlafaxine (EFFEXOR-XR) 150 mg 24 hr capsule Take 150 mg by mouth daily   2018 at Unknown time     Current Meds:   Scheduled Meds:  Current Facility-Administered Medications:  acetaminophen 650 mg Oral Q6H PRN Mica Qureshi MD    azithromycin 500 mg Oral Q24H Mica Qureshi MD    benzonatate 100 mg Oral TID PRN Mica Qureshi MD    cefTRIAXone 1,000 mg Intravenous Q24H Mica Qureshi MD Last Rate: 1,000 mg (18 8659)   enoxaparin 40 mg Subcutaneous Q24H Claudia Doyle MD ondansetron 4 mg Intravenous Q6H PRN Shmuel Champion MD    venlafaxine 150 mg Oral Daily Shmuel Champion MD      PRN Meds:  acetaminophen 650 mg Q6H PRN   benzonatate 100 mg TID PRN   ondansetron 4 mg Q6H PRN       ____________________________________________________________________    Objective   Vitals:   Temp:  [98 °F (36 7 °C)-98 9 °F (37 2 °C)] 98 9 °F (37 2 °C)  HR:  [80-97] 97  Resp:  [16-18] 18  BP: (129-141)/(67-80) 141/80  Weight (last 2 days)     Date/Time   Weight    12/27/18 0520  93 3 (205 69)    12/27/18 0320  93 3 (205 69)    12/26/18 0848  93 3 (205 69)    12/26/18 0732  93 3 (205 69)            Oxygen Therapy  SpO2: 94 %    IV Infusions:        Nutrition:        Diet Orders            Start     Ordered    12/26/18 0848  Diet Regular; Regular House  Diet effective now     Question Answer Comment   Diet Type Regular    Regular Regular House    RD to adjust diet per protocol? Yes        12/26/18 0847    12/26/18 0832  Room Service  Once     Question:  Type of Service  Answer:  Room Service-Appropriate    12/26/18 0831            Ins/Outs:   I/O       12/25 0701 - 12/26 0700 12/26 0701 - 12/27 0700 12/27 0701 - 12/28 0700    IV Piggyback  900     Total Intake(mL/kg)  900 (9 6)     Net   +900                     Lines/Drains:  Invasive Devices     Peripheral Intravenous Line            Peripheral IV 12/26/18 Right Antecubital 1 day    Peripheral IV 12/26/18 Right Wrist 1 day                ____________________________________________________________________      Physical Exam   Constitutional: She is oriented to person, place, and time  She appears well-developed and well-nourished  HENT:   Head: Normocephalic and atraumatic  Eyes: Pupils are equal, round, and reactive to light  Conjunctivae are normal    Neck: Normal range of motion  Neck supple  Cardiovascular: Normal rate, regular rhythm and normal heart sounds      Pulmonary/Chest: Effort normal and breath sounds normal  No respiratory distress  She has no wheezes  She has no rales  She exhibits no tenderness  95% on room air   Abdominal: Soft  Bowel sounds are normal    Musculoskeletal: Normal range of motion  She exhibits no edema  Neurological: She is alert and oriented to person, place, and time  Skin: Skin is warm and dry  Psychiatric: She has a normal mood and affect        ____________________________________________________________________    Labs:   CBC:   Results from last 7 days  Lab Units 18  0519 18  1017 18  0518   WBC Thousand/uL 8 76  --  10 19*   HEMOGLOBIN g/dL 11 2*  --  15 2   HEMATOCRIT % 34 7*  --  47 6*   MCV fL 95  --  94   PLATELETS Thousands/uL 177 181 275     CMP:   Results from last 7 days  Lab Units 18  0519 18  0518   POTASSIUM mmol/L 3 5 4 0   CHLORIDE mmol/L 105 103   CO2 mmol/L 26 29   BUN mg/dL 8 14   CREATININE mg/dL 0 62 0 92   CALCIUM mg/dL 8 4 9 4   AST U/L 39 43   ALT U/L 59 62   ALK PHOS U/L 87 118*   EGFR ml/min/1 73sq m 103 71     Magnesium:     Phosphorous:     Troponin:     PT/INR:   Results from last 7 days  Lab Units 18  0518   PTT seconds 24*   INR  0 97     Lactic Acid:   Results from last 7 days  Lab Units 18  0846 18  0751   LACTIC ACID mmol/L 1 1 1 3     BNP:     ABG:      Procalcitonin: Invalid input(s): PROCALCITONIN    Imaging:   X-ray chest 1 view portable   Final Result by Titus Quintanilla MD ( 2033)      Consolidation throughout the right lung, predominantly in the right lower lung field, most concerning for pneumonia  Follow-up to resolution suggested              Workstation performed: [id number]         XR chest pa & lateral    (Results Pending)         EK/26:  Sinus tachycardia with T-wave inversions in anteroseptal lateral leads  Micro: No results found for: Bibiana Mcleod, WOUNDCULT, SPUTUMCULTUR, Dipika Hill     ____________________________________________________________________      Code Status: Level 1 - Full Code

## 2018-12-27 NOTE — ASSESSMENT & PLAN NOTE
-clinically this is either community-acquired or aspiration pneumonia  -the patient is doing well on current antibiotic regimen, therefore we can continue this  -if clinical worsening can broaden coverage to include anaerobic antibiotic coverage  -continue to trend for clinical and radiographic improvement  -we will need to follow up this patient in the outpatient setting for resolution of this extensive right-sided multi lobar pneumonia  -we will continue to trend procalcitonin levels for early antibiotic discontinuation  -can continue cefepime and azithromycin presumptively for total of 5 day course that this expected point  -I will order speech evaluation for suspect possibility of silent aspiration

## 2018-12-27 NOTE — PROGRESS NOTES
Progress Note - Sakina Mejia 1965, 46 y o  female MRN: [id number]    Unit/Bed#: [address] [place] [address] Encounter: [id number]    Primary Care Provider: Jacob Holland MD   Date and time admitted to hospital: 2018  5:04 AM        * Sepsis [facility])   Assessment & Plan    POA- as evidenced by fever, tachycardia, leukocytosis, lactic acidosis and CAP  Signs of sepsis have resolved  Continue IV Rocephin Zithromax  Patient's respiratory pathogen profile was negative  Urine for Legionella and pneumococcal antigens were negative  Patient noted to have extensive multilobar pneumonia on the right-Pulmonary was consulted     Abdominal pain with nausea and vomiting    Assessment & Plan    Initial alkaline phosphatase was mildly elevated which is normal today  Symptoms have resolved     Anxiety   Assessment & Plan    Continue Effexor     CAP (community acquired pneumonia)   Assessment & Plan    Refer to above          VTE Pharmacologic Prophylaxis:   Pharmacologic: Enoxaparin (Lovenox)  Mechanical VTE Prophylaxis in Place: Yes    Patient Centered Rounds: I have performed bedside rounds with nursing staff today  Discussions with Specialists or Other Care Team Provider: Yes  Education and Discussions with Family / Patient:Yes  Time Spent for Care: 45 minutes  More than 50% of total time spent on counseling and coordination of care as described above  Current Length of Stay: 1 day(s)  Current Patient Status: Inpatient     Discharge Plan:  Home    Code Status: Level 1 - Full Code      Subjective:   Patient complaining of some chest tightness  Still having some cough    Denies any shortness of breath, palpitations or any further nausea or vomiting      Objective:     Vitals:   Temp (24hrs), Av 4 °F (36 9 °C), Min:98 °F (36 7 °C), Max:98 9 °F (37 2 °C)    Temp:  [98 °F (36 7 °C)-98 9 °F (37 2 °C)] 98 7 °F (37 1 °C)  HR:  [80-97] 97  Resp:  [16-18] 18  BP: (135-141)/(75-81) 135/81  SpO2:  [93 %-99 %] 99 %  Body mass index is 34 23 kg/m²  Input and Output Summary (last 24 hours):   No intake or output data in the 24 hours ending 12/27/18 1520     Physical Exam:     Physical Exam   Constitutional: No distress  HENT:   Head: Normocephalic and atraumatic  Nose: Nose normal    Eyes: Pupils are equal, round, and reactive to light  Conjunctivae are normal    Neck: Normal range of motion  Neck supple  Cardiovascular: Normal rate, regular rhythm and normal heart sounds  Pulmonary/Chest: Effort normal  No respiratory distress  She has no wheezes  She has rales  Right base crackles   Abdominal: Soft  Bowel sounds are normal  She exhibits no distension  There is no tenderness  There is no rebound and no guarding  Musculoskeletal: She exhibits no edema  Neurological: She is alert  No cranial nerve deficit  Skin: Skin is warm and dry  No rash noted  Additional Data:     Labs:      Results from last 7 days  Lab Units 12/27/18  0519 12/26/18  1017 12/26/18  0518   WBC Thousand/uL 8 76  --  10 19*   HEMOGLOBIN g/dL 11 2*  --  15 2   HEMATOCRIT % 34 7*  --  47 6*   PLATELETS Thousands/uL 177 181 275   NEUTROS PCT % 76*  --  84*       Results from last 7 days  Lab Units 12/27/18  0519 12/26/18  0518   SODIUM mmol/L 139 141   POTASSIUM mmol/L 3 5 4 0   CHLORIDE mmol/L 105 103   CO2 mmol/L 26 29   BUN mg/dL 8 14   CREATININE mg/dL 0 62 0 92   CALCIUM mg/dL 8 4 9 4   TOTAL BILIRUBIN mg/dL 0 60 0 60   ALK PHOS U/L 87 118*   ALT U/L 59 62   AST U/L 39 43       Results from last 7 days  Lab Units 12/26/18  0518   INR  0 97         No results found for: HGBA1C        Results from last 7 days  Lab Units 12/27/18  0519 12/26/18  0846 12/26/18  0751 12/26/18  0518   LACTIC ACID mmol/L  --  1 1 1 3 2 1*   PROCALCITONIN ng/ml 4 37*  --   --  <0 05       * I Have Reviewed All Lab Data Listed Above  * Additional Pertinent Lab Tests Reviewed:  [address] Admission Reviewed    Imaging:     X-ray chest 1 view portable Final Result by Nadeem Luna MD (50/80 0582)      Consolidation throughout the right lung, predominantly in the right lower lung field, most concerning for pneumonia  Follow-up to resolution suggested  Workstation performed: [id number]         XR chest pa & lateral    (Results Pending)   FL barium swallow video w speech    (Results Pending)     Imaging Reports Reviewed by myself    Cultures:   Blood Culture:   Lab Results   Component Value Date    BLOODCX No Growth at 24 hrs  12/26/2018    BLOODCX No Growth at 24 hrs  12/26/2018     Urine Culture: No results found for: URINECX  Sputum Culture: No components found for: SPUTUMCX  Wound Culture: No results found for: WOUNDCULT    Last 24 Hours Medication List:     Current Facility-Administered Medications:  acetaminophen 650 mg Oral Q6H PRN Pauline Guzmán MD    albuterol 2 5 mg Nebulization Q4H PRN Roderick Jane MD    albuterol 2 5 mg Nebulization Q6H Roderick aJne MD    azithromycin 500 mg Oral Q24H Pauline Guzmán MD    benzonatate 100 mg Oral TID PRN Pauline Guzmán MD    cefTRIAXone 1,000 mg Intravenous Q24H Pauline Guzmán MD Last Rate: 1,000 mg (12/27/18 3732)   enoxaparin 40 mg Subcutaneous Q24H [address] Jose E Conroy MD    famotidine 20 mg Oral BID Carmen Rosales PA-C    ondansetron 4 mg Intravenous Q6H PRN Pauline Guzmán MD    venlafaxine 150 mg Oral Daily Pauline Guzmán MD         Today, Patient Was Seen By: Ousmane Naidu MD    ** Please Note: Dragon 360 Dictation voice to text software may have been used in the creation of this document   **

## 2018-12-27 NOTE — SPEECH THERAPY NOTE
Speech Language/Pathology  Bedside Swallowing Evaluation    Patient Name: Ambrose THRASHER Date: 2018     Problem List  Patient Active Problem List   Diagnosis    Sepsis ([facility] [address] )    CAP (community acquired pneumonia)    Abdominal pain with nausea and vomiting     Anxiety    Aspiration pneumonitis ([facility] [address] )    History of esophageal stricture     Past Medical History  Past Medical History:   Diagnosis Date    Anxiety     Psychiatric disorder      Past Surgical History  Past Surgical History:   Procedure Laterality Date     SECTION      HIP SURGERY          18 1321   Swallow Information   Current Risks for Dysphagia & Aspiration Respiratory compromise  (pneumonia once per year, hx esophageal dysphagia )   Current Symptoms/Concerns (Pt  denies difficulty swallowing current diet  )   Current Diet Regular; Thin liquid   Baseline Diet Regular; Thin liquids  (Pt  reports dilitation 2 years ago  No dysphagia symptoms )   Baseline Assessment   Behavior/Cognition Alert; Cooperative; Interactive   Speech/Language Status Expressive and receptive language skills WNL  No dysarthria  Patient Positioning Upright in bed   Swallow Mechanism Exam   Labial Symmetry WFL   Labial Strength WFL   Labial ROM WFL   Labial Sensation WFL   Facial Symmetry WFL   Facial Strength WFL   Facial ROM WFL   Facial Sensation WFL   Lingual Symmetry WFL   Lingual Strength WFL   Lingual ROM WFL   Lingual Sensation WFL   Velum WFL   Gag (Did not assess )   Mandible WFL   Dentition Adequate   Volitional Cough Strong   Tracheostomy No   Consistencies Assessed and Performance   Materials Admnistered Regular/Solid; Thin liquid   Materials Adminstered Comment Thin liquid water from a straw, cookie  Oral Stage WFL   Oral Stage Comment Normal timing of oral preparation and transport, patient independently takes smaller bites of food due to esophageal hx     Phargngeal Stage WFL   Pharyngeal Stage Comment No delay in the initiation of the swallow, good laryngeal elevation, no coughing or choking during or following the swallow, clear voice quality following all swallows, denies sensation of residual in pharynx or esophagus  Swallow Mechanics WFL;Swallow initation; Appears prompt;Good Larygneal rise   Esophageal Concerns (Hx esophageal dilitation, esophageal strictures )   Strategies and Efficacy Pt  independently takes smaller bites of food  Has not f/u with GI for two years  Summary   Swallow Summary Swallow skills are safe and WNL to continue a regular consistency diet with thin liquids  Videofluoroscopic swallowing evaluation to be completed 12/28/18 at 9:00am    Recommendations   Risk for Aspiration Mild   Recommendations Continue swallow eval process   Diet Solid Recommendation Regular consistency   Diet Liquid Recommendation Thin liquid   Recommended Form of Meds As desired   General Precautions Aspiration precautions;Upright as possible for all oral intake;Remain upright for 45 mins after meals   Compensatory Swallowing Strategies (small bites of solids )   Further Evaluations Gastroenterology   Results Reviewed with RN;PT/Family/Caregiver   Treatment Recommendations   Duration of treatment pending results of MBS  Follow up treatments Patient/family education   Dysphagia Goals Patient will tolerate recommended diet without observed clinical signs of oral/pharngeal dysphagia   Speech Therapy Prognosis   Prognosis Good   Prognosis Considerations Age; Patient Participation Level; Availability of Services;Previous Level of Function;Severity of Impairments     ILDA Jordan S , [address] [facility]  Speech-Language Pathologist  [id number]

## 2018-12-28 ENCOUNTER — APPOINTMENT (INPATIENT)
Dept: RADIOLOGY | Facility: HOSPITAL | Age: 53
DRG: 720 | End: 2018-12-28
Payer: COMMERCIAL

## 2018-12-28 VITALS
WEIGHT: 205.69 LBS | DIASTOLIC BLOOD PRESSURE: 81 MMHG | BODY MASS INDEX: 34.27 KG/M2 | HEIGHT: 65 IN | RESPIRATION RATE: 20 BRPM | HEART RATE: 106 BPM | TEMPERATURE: 98.3 F | OXYGEN SATURATION: 99 % | SYSTOLIC BLOOD PRESSURE: 144 MMHG

## 2018-12-28 LAB
ANION GAP SERPL CALCULATED.3IONS-SCNC: 7 MMOL/L (ref 4–13)
BASOPHILS # BLD AUTO: 0.02 THOUSANDS/ΜL (ref 0–0.1)
BASOPHILS NFR BLD AUTO: 0 % (ref 0–1)
BUN SERPL-MCNC: 8 MG/DL (ref 5–25)
CALCIUM SERPL-MCNC: 8.7 MG/DL (ref 8.3–10.1)
CHLORIDE SERPL-SCNC: 105 MMOL/L (ref 100–108)
CO2 SERPL-SCNC: 28 MMOL/L (ref 21–32)
CREAT SERPL-MCNC: 0.7 MG/DL (ref 0.6–1.3)
EOSINOPHIL # BLD AUTO: 0.02 THOUSAND/ΜL (ref 0–0.61)
EOSINOPHIL NFR BLD AUTO: 0 % (ref 0–6)
ERYTHROCYTE [DISTWIDTH] IN BLOOD BY AUTOMATED COUNT: 12.9 % (ref 11.6–15.1)
GFR SERPL CREATININE-BSD FRML MDRD: 99 ML/MIN/1.73SQ M
GLUCOSE SERPL-MCNC: 93 MG/DL (ref 65–140)
HCT VFR BLD AUTO: 35.7 % (ref 34.8–46.1)
HGB BLD-MCNC: 11.6 G/DL (ref 11.5–15.4)
IMM GRANULOCYTES # BLD AUTO: 0.01 THOUSAND/UL (ref 0–0.2)
IMM GRANULOCYTES NFR BLD AUTO: 0 % (ref 0–2)
LYMPHOCYTES # BLD AUTO: 1.35 THOUSANDS/ΜL (ref 0.6–4.47)
LYMPHOCYTES NFR BLD AUTO: 22 % (ref 14–44)
MCH RBC QN AUTO: 30.7 PG (ref 26.8–34.3)
MCHC RBC AUTO-ENTMCNC: 32.5 G/DL (ref 31.4–37.4)
MCV RBC AUTO: 94 FL (ref 82–98)
MONOCYTES # BLD AUTO: 0.41 THOUSAND/ΜL (ref 0.17–1.22)
MONOCYTES NFR BLD AUTO: 7 % (ref 4–12)
NEUTROPHILS # BLD AUTO: 4.33 THOUSANDS/ΜL (ref 1.85–7.62)
NEUTS SEG NFR BLD AUTO: 71 % (ref 43–75)
NRBC BLD AUTO-RTO: 0 /100 WBCS
PLATELET # BLD AUTO: 175 THOUSANDS/UL (ref 149–390)
PMV BLD AUTO: 9.6 FL (ref 8.9–12.7)
POTASSIUM SERPL-SCNC: 3.4 MMOL/L (ref 3.5–5.3)
PROCALCITONIN SERPL-MCNC: 2.68 NG/ML
RBC # BLD AUTO: 3.78 MILLION/UL (ref 3.81–5.12)
SODIUM SERPL-SCNC: 140 MMOL/L (ref 136–145)
WBC # BLD AUTO: 6.14 THOUSAND/UL (ref 4.31–10.16)

## 2018-12-28 PROCEDURE — 99232 SBSQ HOSP IP/OBS MODERATE 35: CPT | Performed by: INTERNAL MEDICINE

## 2018-12-28 PROCEDURE — 99239 HOSP IP/OBS DSCHRG MGMT >30: CPT | Performed by: INTERNAL MEDICINE

## 2018-12-28 PROCEDURE — 85025 COMPLETE CBC W/AUTO DIFF WBC: CPT | Performed by: INTERNAL MEDICINE

## 2018-12-28 PROCEDURE — 80048 BASIC METABOLIC PNL TOTAL CA: CPT | Performed by: INTERNAL MEDICINE

## 2018-12-28 PROCEDURE — 84145 PROCALCITONIN (PCT): CPT | Performed by: PHYSICIAN ASSISTANT

## 2018-12-28 PROCEDURE — 94640 AIRWAY INHALATION TREATMENT: CPT

## 2018-12-28 PROCEDURE — 71045 X-RAY EXAM CHEST 1 VIEW: CPT

## 2018-12-28 PROCEDURE — 94760 N-INVAS EAR/PLS OXIMETRY 1: CPT

## 2018-12-28 RX ORDER — ALBUTEROL SULFATE 90 UG/1
2 AEROSOL, METERED RESPIRATORY (INHALATION) EVERY 6 HOURS PRN
Qty: 1 INHALER | Refills: 0 | Status: SHIPPED | OUTPATIENT
Start: 2018-12-28 | End: 2019-01-27

## 2018-12-28 RX ORDER — CEFPODOXIME PROXETIL 200 MG/1
200 TABLET, FILM COATED ORAL 2 TIMES DAILY
Qty: 8 TABLET | Refills: 0 | Status: SHIPPED | OUTPATIENT
Start: 2018-12-28 | End: 2018-12-28 | Stop reason: HOSPADM

## 2018-12-28 RX ORDER — LEVOFLOXACIN 500 MG/1
500 TABLET, FILM COATED ORAL EVERY 24 HOURS
Qty: 4 TABLET | Refills: 0 | Status: SHIPPED | OUTPATIENT
Start: 2018-12-28 | End: 2019-01-01

## 2018-12-28 RX ORDER — POTASSIUM CHLORIDE 20 MEQ/1
40 TABLET, EXTENDED RELEASE ORAL ONCE
Status: COMPLETED | OUTPATIENT
Start: 2018-12-28 | End: 2018-12-28

## 2018-12-28 RX ADMIN — AZITHROMYCIN 500 MG: 250 TABLET, FILM COATED ORAL at 05:46

## 2018-12-28 RX ADMIN — POTASSIUM CHLORIDE 40 MEQ: 1500 TABLET, EXTENDED RELEASE ORAL at 09:50

## 2018-12-28 RX ADMIN — ALBUTEROL SULFATE 2.5 MG: 2.5 SOLUTION RESPIRATORY (INHALATION) at 13:32

## 2018-12-28 RX ADMIN — ENOXAPARIN SODIUM 40 MG: 40 INJECTION SUBCUTANEOUS at 09:50

## 2018-12-28 RX ADMIN — FAMOTIDINE 20 MG: 20 TABLET ORAL at 09:50

## 2018-12-28 RX ADMIN — CEFTRIAXONE 1000 MG: 1 INJECTION, SOLUTION INTRAVENOUS at 05:46

## 2018-12-28 RX ADMIN — VENLAFAXINE HYDROCHLORIDE 150 MG: 150 CAPSULE, EXTENDED RELEASE ORAL at 09:50

## 2018-12-28 RX ADMIN — ALBUTEROL SULFATE 2.5 MG: 2.5 SOLUTION RESPIRATORY (INHALATION) at 07:38

## 2018-12-28 NOTE — SPEECH THERAPY NOTE
Speech Language/Pathology  Canceled Procedure    Order for videofluoroscopy of swallow canceled      ILDA Ring , [address] Pathologist  [id number]

## 2018-12-28 NOTE — PROGRESS NOTES
Progress Note - Pulmonary   Brady Chantal 46 y o  female MRN: [id number]  Unit/Bed#: [address] [address] Encounter: [id number]    Assessment:  Sepsis secondary to multilobar pneumonia right lung  This is community-acquired pneumonia  Patient clinically improved  Serum procalcitonin level decreased to 2 68 from 4 37  Urine for strep pneumonia and Legionella L antigen are negative  Chest x-ray done today shows reveals marked improvement in right lung infiltrate  There has been near complete resolution of alveolar infiltrate in the right upper and right lower lobes    Plan: Today is day 3 of IV ceftriaxone and azithromycin  Patient did receive her dose this morning  Could discharge home with prescription for Vantin 200 mg b i d  for 4 more days to start tomorrow  Since chest x-rays almost complete min normal now no need for follow-up chest x-ray  Patient contact our office if she has any further problems as outpatient in regards to her pneumonia  I discussed chest x-ray findings and antibiotic therapy with Dr Corey Hargrove  Subjective:   Patient has some fatigue but not having any shortness of breath and having minimal cough  No shortness of breath or chest pain    Objective:     Vitals: Blood pressure 132/76, pulse 101, temperature 98 1 °F (36 7 °C), temperature source Oral, resp  rate 18, height 5' 5" (1 651 m), weight 93 3 kg (205 lb 11 oz), SpO2 98 %  ,Body mass index is 34 23 kg/m²  Intake/Output Summary (Last 24 hours) at 12/28/18 1310  Last data filed at 12/28/18 4616   Gross per 24 hour   Intake                0 ml   Output              550 ml   Net             -550 ml       Physical Exam: Physical Exam   Constitutional: She is oriented to person, place, and time  She appears well-developed and well-nourished  No distress  Room air O2 saturation is 98%   HENT:   Head: Normocephalic  Nose: Nose normal    Mouth/Throat: Oropharynx is clear and moist  No oropharyngeal exudate     Eyes: Pupils are equal, round, and reactive to light  Conjunctivae are normal    Neck: Neck supple  No JVD present  No tracheal deviation present  Cardiovascular: Normal rate, regular rhythm and normal heart sounds  Pulmonary/Chest: Effort normal  She has no wheezes  She has no rales  Lung sounds are clear   Abdominal: Soft  She exhibits no distension  There is no tenderness  There is no guarding  Musculoskeletal: She exhibits no edema  Lymphadenopathy:     She has no cervical adenopathy  Neurological: She is alert and oriented to person, place, and time  Skin: Skin is warm and dry  No rash noted  Psychiatric: She has a normal mood and affect  Her behavior is normal  Thought content normal         Labs: I have personally reviewed pertinent lab results  , ABG: No results found for: PHART, GYE2RFT, PO2ART, BQP4JXD, T5IUVCLV, BEART, SOURCE, BNP: No results found for: BNP, CBC: Lab Results   Component Value Date    WBC 6 14 12/28/2018    HGB 11 6 12/28/2018    HCT 35 7 12/28/2018    MCV 94 12/28/2018     12/28/2018    MCH 30 7 12/28/2018    MCHC 32 5 12/28/2018    RDW 12 9 12/28/2018    MPV 9 6 12/28/2018    NRBC 0 12/28/2018   , CMP: Lab Results   Component Value Date    K 3 4 (L) 12/28/2018     12/28/2018    CO2 28 12/28/2018    BUN 8 12/28/2018    CREATININE 0 70 12/28/2018    CALCIUM 8 7 12/28/2018    AST 39 12/27/2018    ALT 59 12/27/2018    ALKPHOS 87 12/27/2018    EGFR 99 12/28/2018   , PT/INR:   Lab Results   Component Value Date    INR 0 97 12/26/2018   , Troponin: No results found for: TROPONIN    Imaging and other studies: I have personally reviewed pertinent reports     and I have personally reviewed pertinent films in PACS

## 2018-12-28 NOTE — NURSING NOTE
Pt  Left via walking with a steady gait accompanied by family  Discharge instructions provided  IV dc and intact  Up to date on vaccinations  Non-smoker  No further questions at this time

## 2018-12-28 NOTE — PLAN OF CARE
DISCHARGE PLANNING - CARE MANAGEMENT     Discharge to post-acute care or home with appropriate resources Not Progressing        INFECTION - ADULT     Absence or prevention of progression during hospitalization Not Progressing     Absence of fever/infection during neutropenic period Not Progressing        PAIN - ADULT     Verbalizes/displays adequate comfort level or baseline comfort level Not Progressing        SAFETY ADULT     Patient will remain free of falls Not Progressing     Maintain or return to baseline ADL function Not Progressing     Maintain or return mobility status to optimal level Not Progressing

## 2018-12-28 NOTE — ASSESSMENT & PLAN NOTE
POA- as evidenced by fever, tachycardia, leukocytosis, lactic acidosis and CAP      Signs of sepsis have resolved  Patient received 3 days of IV Rocephin and Zithromax  Patient also had a swallow evaluation and was doing well  Patient's MRSA surveillance, respiratory pathogen profile, flu swab and blood cultures were negative  Chest x-ray shows significant improvement of the right lung infiltrate  Will discharge patient home on Levaquin 500 milligram for another 4 days

## 2018-12-28 NOTE — DISCHARGE SUMMARY
Discharge- Lexy Read 1965, 46 y o  female MRN: [id number]    Unit/Bed#: [address] Encounter: [id number]    Primary Care Provider: Roque Khalil MD   Date and time admitted to hospital: 12/26/2018  5:04 AM        * Sepsis [facility])   Assessment & Plan    POA- as evidenced by fever, tachycardia, leukocytosis, lactic acidosis and CAP      Signs of sepsis have resolved  Patient received 3 days of IV Rocephin and Zithromax  Patient also had a swallow evaluation and was doing well  Patient's MRSA surveillance, respiratory pathogen profile, flu swab and blood cultures were negative  Chest x-ray shows significant improvement of the right lung infiltrate  Will discharge patient home on Levaquin 500 milligram for another 4 days     Abdominal pain with nausea and vomiting    Assessment & Plan    Initial alkaline phosphatase was mildly elevated which has normalized  Symptoms have resolved     Ella 66       Discharging Physician / Practitioner: Warren Campbell MD  PCP: Roque Khalil MD  Admission Date: 12/26/2018  Discharge Date: 12/28/18    Reason for Admission: Chills (pt states woke up with chills, cough and chest tightness 1 hour ago, also c/o nausea and vomiting) and Cough        Resolved Problems  Date Reviewed: 12/28/2018    None          Consultations During Hospital Stay:  Raúl Worrell Findings / Test Results:     ·     Results from last 7 days  Lab Units 12/28/18  0545 12/27/18  0519 12/26/18  1017 12/26/18  0518   WBC Thousand/uL 6 14 8 76  --  10 19*   HEMOGLOBIN g/dL 11 6 11 2*  --  15 2   PLATELETS Thousands/uL 175 177 181 275       Results from last 7 days  Lab Units 12/28/18  0545 12/27/18  0519 12/26/18  0518   SODIUM mmol/L 140 139 141   POTASSIUM mmol/L 3 4* 3 5 4 0   CHLORIDE mmol/L 105 105 103   CO2 mmol/L 28 26 29   BUN mg/dL 8 8 14   CREATININE mg/dL 0 70 0 62 0 92   CALCIUM mg/dL 8 7 8 4 9 4   TOTAL BILIRUBIN mg/dL  --  0 60 0 60   ALK PHOS U/L  --  87 118*   ALT U/L  --  59 62   AST U/L  --  39 43       Results from last 7 days  Lab Units 12/26/18  0518   INR  0 97         No results found for: HGBA1C        Results from last 7 days  Lab Units 12/28/18  0545 12/27/18  0519 12/26/18  0846 12/26/18  0751 12/26/18  0518   LACTIC ACID mmol/L  --   --  1 1 1 3 2 1*   PROCALCITONIN ng/ml 2 68* 4 37*  --   --  <0 05     Blood Culture:   Lab Results   Component Value Date    BLOODCX No Growth at 48 hrs  12/26/2018    BLOODCX No Growth at 48 hrs  12/26/2018     Urine Culture: No results found for: URINECX  Sputum Culture: No components found for: SPUTUMCX  Wound Culture: No results found for: WOUNDCULT     XR chest portable   Final Result by Loulou Langford MD (12/28 9335)      Previously described infiltrates in the right lung are almost completely resolved  Workstation performed: [id number]         X-ray chest 1 view portable   Final Result by Gilles Hernandez MD (50/68 [id number])      Consolidation throughout the right lung, predominantly in the right lower lung field, most concerning for pneumonia  Follow-up to resolution suggested  Workstation performed: [id number]              Outpatient Tests Requested: Follow-up with PCP    Complications:  None    Reason for Admission:   Chief Complaint   Patient presents with    Chills     pt states woke up with chills, cough and chest tightness 1 hour ago, also c/o nausea and vomiting    Cough       Hospital Course:     Geraldine Navarro is a 46 y o  female patient with a PMH of anxiety who originally presented to the hospital on 12/26/2018 due to cough, fever, chills and shortness of breath     In the ED patient's chest x ray  showed right lower lobe and right middle lobe pneumonia   Patient was started on IV Rocephin and Zithromax  Later patient was seen by Pulmonary due to significant pneumonia    Patient's blood cultures, MRSA surveillance, flu swab, respiratory pathogen profile were all negative  Patient continued to improve and repeat chest x-ray showed significant improvement of the right-sided infiltrates  Patient will be discharged home on Levaquin for 4 days    Please see above list of diagnoses and related plan for additional information  Condition at Discharge: stable       Discharge Day Visit / Exam:     Subjective:  Patient is feeling much better  Occasional cough  Denies any shortness of breath on exertion  Vitals: Blood Pressure: 144/81 (12/28/18 1345)  Pulse: (!) 106 (12/28/18 1345)  Temperature: 98 3 °F (36 8 °C) (12/28/18 1345)  Temp Source: Oral (12/28/18 1345)  Respirations: 20 (12/28/18 1345)  Height: 5' 5" (165 1 cm) (12/26/18 0732)  Weight - Scale: 93 3 kg (205 lb 11 oz) (12/28/18 0545)  SpO2: 99 % (12/28/18 1345)  Exam:   Physical Exam   Constitutional: No distress  HENT:   Head: Normocephalic and atraumatic  Nose: Nose normal    Eyes: Pupils are equal, round, and reactive to light  Conjunctivae are normal    Neck: Normal range of motion  Neck supple  Cardiovascular: Normal rate, regular rhythm and normal heart sounds  Pulmonary/Chest: Effort normal and breath sounds normal  No respiratory distress  She has no wheezes  She has no rales  Abdominal: Soft  Bowel sounds are normal  She exhibits no distension  There is no tenderness  There is no rebound and no guarding  Musculoskeletal: She exhibits no edema  Neurological: She is alert  No cranial nerve deficit  Skin: Skin is warm and dry  No rash noted  Discharge instructions/Information to patient and family:   See after visit summary for information provided to patient and family  Provisions for Follow-Up Care:  See after visit summary for information related to follow-up care and any pertinent home health orders  Disposition:     Home    Planned Readmission: No     Discharge Statement:  I spent 35 minutes discharging the patient   This time was spent on the day of discharge  I had direct contact with the patient on the day of discharge  Greater than 50% of the total time was spent examining patient, answering all patient questions, arranging and discussing plan of care with patient as well as directly providing post-discharge instructions  Additional time then spent on discharge activities  Discharge Medications:  See after visit summary for reconciled discharge medications provided to patient and family        ** Please Note: This note has been constructed using a voice recognition system **

## 2018-12-31 LAB
BACTERIA BLD CULT: NORMAL
BACTERIA BLD CULT: NORMAL

## 2022-07-30 ENCOUNTER — HOSPITAL ENCOUNTER (EMERGENCY)
Facility: HOSPITAL | Age: 57
Discharge: HOME | End: 2022-07-30
Attending: EMERGENCY MEDICINE
Payer: COMMERCIAL

## 2022-07-30 VITALS
TEMPERATURE: 98.5 F | BODY MASS INDEX: 34.99 KG/M2 | WEIGHT: 210 LBS | OXYGEN SATURATION: 98 % | RESPIRATION RATE: 20 BRPM | DIASTOLIC BLOOD PRESSURE: 97 MMHG | HEART RATE: 96 BPM | HEIGHT: 65 IN | SYSTOLIC BLOOD PRESSURE: 177 MMHG

## 2022-07-30 DIAGNOSIS — R04.0 RIGHT-SIDED EPISTAXIS: Primary | ICD-10-CM

## 2022-07-30 PROCEDURE — 99284 EMERGENCY DEPT VISIT MOD MDM: CPT | Performed by: EMERGENCY MEDICINE

## 2022-07-30 PROCEDURE — 30901 CONTROL OF NOSEBLEED: CPT | Performed by: EMERGENCY MEDICINE

## 2022-07-30 PROCEDURE — 99283 EMERGENCY DEPT VISIT LOW MDM: CPT

## 2022-07-30 RX ORDER — OXYMETAZOLINE HYDROCHLORIDE 0.05 G/100ML
2 SPRAY NASAL ONCE
Status: COMPLETED | OUTPATIENT
Start: 2022-07-30 | End: 2022-07-30

## 2022-07-30 RX ADMIN — SILVER NITRATE APPLICATORS 1 APPLICATOR: 25; 75 STICK TOPICAL at 05:16

## 2022-07-30 RX ADMIN — OXYMETAZOLINE HYDROCHLORIDE 2 SPRAY: 0.05 SPRAY NASAL at 05:16

## 2022-07-30 NOTE — ED PROVIDER NOTES
History  Chief Complaint   Patient presents with   [address] [address]     Patient comes this am due to nosebleed that started at 4 am, not on blood thinners, states she woke up swallowing and when she brought her head up blood started draining down nose     Patient presents for evaluation for spontaneous nose bleed starting this morning at 4:00 a m  Nelson Armstrong Denies any trauma or injury  Patient still bleeding arrival   Denies any blood thinners  History provided by:  Patient   used: No        Prior to Admission Medications   Prescriptions Last Dose Informant Patient Reported? Taking?   venlafaxine (EFFEXOR-XR) 150 mg 24 hr capsule   Yes Yes   Sig: Take 150 mg by mouth daily      Facility-Administered Medications: None       Past Medical History:   Diagnosis Date    Anxiety     Psychiatric disorder        Past Surgical History:   Procedure Laterality Date     SECTION      HIP SURGERY         History reviewed  No pertinent family history  I have reviewed and agree with the history as documented  E-Cigarette/Vaping     E-Cigarette/Vaping Substances     Social History     Tobacco Use    Smoking status: Never Smoker    Smokeless tobacco: Never Used   Substance Use Topics    Alcohol use: No    Drug use: No       Review of Systems   Constitutional: Negative for chills and fever  HENT: Positive for nosebleeds  Negative for ear pain and sore throat  Eyes: Negative for pain and visual disturbance  Respiratory: Negative for cough and shortness of breath  Cardiovascular: Negative for chest pain and palpitations  Gastrointestinal: Negative for abdominal pain and vomiting  Genitourinary: Negative for dysuria and hematuria  Musculoskeletal: Negative for arthralgias and back pain  Skin: Negative for color change and rash  Neurological: Negative for seizures and syncope  All other systems reviewed and are negative        Physical Exam  Physical Exam  Vitals and nursing note reviewed  Constitutional:       General: She is not in acute distress  HENT:      Head: Atraumatic  Right Ear: External ear normal       Left Ear: External ear normal       Mouth/Throat:      Mouth: Mucous membranes are moist       Pharynx: Oropharynx is clear  Eyes:      General: No scleral icterus  Conjunctiva/sclera: Conjunctivae normal    Cardiovascular:      Rate and Rhythm: Normal rate and regular rhythm  Pulses: Normal pulses  Pulmonary:      Effort: Pulmonary effort is normal  No respiratory distress  Breath sounds: Normal breath sounds  Musculoskeletal:         General: No deformity  Normal range of motion  Skin:     Capillary Refill: Capillary refill takes less than 2 seconds  Findings: No rash  Neurological:      General: No focal deficit present  Mental Status: She is alert and oriented to person, place, and time  Vital Signs  ED Triage Vitals [07/30/22 0515]   Temperature Pulse Respirations Blood Pressure SpO2   98 5 °F (36 9 °C) 96 20 (!) 189/100 98 %      Temp Source Heart Rate Source Patient Position - Orthostatic VS BP Location FiO2 (%)   Tympanic Monitor Sitting Right arm --      Pain Score       --           Vitals:    07/30/22 0515 07/30/22 0700   BP: (!) 189/100 (!) 177/97   Pulse: 96    Patient Position - Orthostatic VS: Sitting Sitting         Visual Acuity      ED Medications  Medications   oxymetazoline (AFRIN) 0 05 % nasal spray 2 spray (2 sprays Each Nare Given 7/30/22 0516)   silver nitrate-potassium nitrate (ARZOL SILVER NITRATE) 03-39 % applicator 1 applicator (1 applicator Topical Given 7/30/22 0516)       Diagnostic Studies  Results Reviewed     None                 No orders to display              Procedures  Epistaxis management    Date/Time: 7/30/2022 6:24 AM  Performed by: Susannah Marin DO  Authorized by: Susannah Marin DO   Universal Protocol:  Consent: Verbal consent obtained    Consent given by: patient  Patient identity confirmed: verbally with patient and arm band      Patient location:  ED  Procedure details:     Treatment site:  R anterior    Hemostasis method:  Merocel sponge    Approach:  External    Treatment complexity:  Limited    Treatment episode: initial    Post-procedure details:     Assessment:  Bleeding stopped    Patient tolerance of procedure: Tolerated well, no immediate complications             ED Course                                             MDM  Number of Diagnoses or Management Options  Right-sided epistaxis  Diagnosis management comments: Pulse ox 98% on room air indicating adequate oxygenation  Amount and/or Complexity of Data Reviewed  Decide to obtain previous medical records or to obtain history from someone other than the patient: yes  Review and summarize past medical records: yes    Patient Progress  Patient progress: improved      Disposition  Final diagnoses:   Right-sided epistaxis     Time reflects when diagnosis was documented in both MDM as applicable and the Disposition within this note     Time User Action Codes Description Comment    7/30/2022  6:38 AM Aliza Eldridge Add [R04 0] Right-sided epistaxis       ED Disposition     ED Disposition   Discharge    Condition   Stable    Date/Time   Sat Jul 30, 2022  6:38 AM    Comment   Jewel Howell discharge to home/self care                 Follow-up Information     Follow up With Specialties Details Why Contact Info Additional Information    Bettie Alonso MD Otolaryngology  or ER in 3 days for packing removal [facility], [address] [place] [address] Emergency Department Emergency Medicine  If symptoms worsen [address] [address] Emergency Department, [place], [place], [address]          Discharge Medication List as of 7/30/2022  6:38 AM      CONTINUE these medications which have NOT CHANGED    Details   venlafaxine (EFFEXOR-XR) 150 mg 24 hr capsule Take 150 mg by mouth daily, Historical Med             No discharge procedures on file      PDMP Review     None          ED Provider  Electronically Signed by           Kamilla Nieves DO  07/31/22 5450

## 2022-07-30 NOTE — ED NOTES
ER MD placed device into right nostril to ceases bleeding, other nostril noted to be draining some blood,gauze placed in nostril  Patient states she still feels blood trickling in back of her throat, will continue to monitor       Katlyn Madrid RN  07/30/22 0062

## 2024-02-21 PROBLEM — A41.9 SEPSIS (HCC): Status: RESOLVED | Noted: 2018-12-26 | Resolved: 2024-02-21

## 2024-02-21 PROBLEM — J18.9 CAP (COMMUNITY ACQUIRED PNEUMONIA): Status: RESOLVED | Noted: 2018-12-26 | Resolved: 2024-02-21

## 2024-10-04 ENCOUNTER — APPOINTMENT (EMERGENCY)
Dept: RADIOLOGY | Facility: HOSPITAL | Age: 59
End: 2024-10-04
Payer: COMMERCIAL

## 2024-10-04 ENCOUNTER — HOSPITAL ENCOUNTER (EMERGENCY)
Facility: HOSPITAL | Age: 59
Discharge: HOME | End: 2024-10-04
Attending: EMERGENCY MEDICINE
Payer: COMMERCIAL

## 2024-10-04 VITALS
BODY MASS INDEX: 35.78 KG/M2 | WEIGHT: 215 LBS | RESPIRATION RATE: 22 BRPM | DIASTOLIC BLOOD PRESSURE: 107 MMHG | OXYGEN SATURATION: 98 % | HEART RATE: 100 BPM | TEMPERATURE: 98 F | SYSTOLIC BLOOD PRESSURE: 190 MMHG
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
## 2024-10-04 DIAGNOSIS — S61.419A HAND LACERATION: ICD-10-CM

## 2024-10-04 DIAGNOSIS — S01.81XA FACIAL LACERATION, INITIAL ENCOUNTER: ICD-10-CM

## 2024-10-04 PROCEDURE — 73130 X-RAY EXAM OF HAND: CPT

## 2024-10-04 PROCEDURE — 72125 CT NECK SPINE W/O DYE: CPT

## 2024-10-04 PROCEDURE — 99284 EMERGENCY DEPT VISIT MOD MDM: CPT

## 2024-10-04 PROCEDURE — 12013 RPR F/E/E/N/L/M 2.6-5.0 CM: CPT | Performed by: EMERGENCY MEDICINE

## 2024-10-04 PROCEDURE — 70486 CT MAXILLOFACIAL W/O DYE: CPT

## 2024-10-04 PROCEDURE — 90471 IMMUNIZATION ADMIN: CPT

## 2024-10-04 PROCEDURE — 99284 EMERGENCY DEPT VISIT MOD MDM: CPT | Performed by: EMERGENCY MEDICINE

## 2024-10-04 PROCEDURE — 70450 CT HEAD/BRAIN W/O DYE: CPT

## 2024-10-04 PROCEDURE — 12002 RPR S/N/AX/GEN/TRNK2.6-7.5CM: CPT | Performed by: EMERGENCY MEDICINE

## 2024-10-04 PROCEDURE — 73080 X-RAY EXAM OF ELBOW: CPT

## 2024-10-04 PROCEDURE — 90715 TDAP VACCINE 7 YRS/> IM: CPT | Performed by: EMERGENCY MEDICINE

## 2024-10-04 RX ORDER — IBUPROFEN 600 MG/1
600 TABLET, FILM COATED ORAL ONCE
Status: COMPLETED | OUTPATIENT
Start: 2024-10-04 | End: 2024-10-04

## 2024-10-04 RX ORDER — CEPHALEXIN 500 MG/1
500 CAPSULE ORAL EVERY 12 HOURS SCHEDULED
Qty: 14 CAPSULE | Refills: 0 | Status: SHIPPED | OUTPATIENT
Start: 2024-10-04 | End: 2024-10-11

## 2024-10-04 RX ORDER — GINSENG 100 MG
1 CAPSULE ORAL ONCE
Status: COMPLETED | OUTPATIENT
Start: 2024-10-04 | End: 2024-10-04

## 2024-10-04 RX ORDER — GINSENG 100 MG
1 CAPSULE ORAL 2 TIMES DAILY
Qty: 28 G | Refills: 0 | Status: SHIPPED | OUTPATIENT
Start: 2024-10-04

## 2024-10-04 RX ORDER — LIDOCAINE HYDROCHLORIDE AND EPINEPHRINE 10; 10 MG/ML; UG/ML
20 INJECTION, SOLUTION INFILTRATION; PERINEURAL ONCE
Status: COMPLETED | OUTPATIENT
Start: 2024-10-04 | End: 2024-10-04

## 2024-10-04 RX ORDER — ACETAMINOPHEN 325 MG/1
975 TABLET ORAL ONCE
Status: COMPLETED | OUTPATIENT
Start: 2024-10-04 | End: 2024-10-04

## 2024-10-04 RX ADMIN — LIDOCAINE HYDROCHLORIDE,EPINEPHRINE BITARTRATE 20 ML: 10; .01 INJECTION, SOLUTION INFILTRATION; PERINEURAL at 18:52

## 2024-10-04 RX ADMIN — TETANUS TOXOID, REDUCED DIPHTHERIA TOXOID AND ACELLULAR PERTUSSIS VACCINE, ADSORBED 0.5 ML: 5; 2.5; 8; 8; 2.5 SUSPENSION INTRAMUSCULAR at 20:22

## 2024-10-04 RX ADMIN — ACETAMINOPHEN 975 MG: 325 TABLET ORAL at 18:28

## 2024-10-04 RX ADMIN — IBUPROFEN 600 MG: 600 TABLET, FILM COATED ORAL at 18:28

## 2024-10-04 RX ADMIN — BACITRACIN ZINC 1 SMALL APPLICATION: 500 OINTMENT TOPICAL at 18:52

## 2024-10-04 NOTE — ED PROVIDER NOTES
Final diagnoses:   Fall, initial encounter   Facial laceration, initial encounter   Hand laceration     ED Disposition       ED Disposition   Discharge    Condition   Stable    Date/Time   Fri Oct 4, 2024  8:18 PM    Comment                   Assessment & Plan       Medical Decision Making  Patient evaluated with imaging discussed the findings with the patient updated tetanus shot repaired lacerations as described discharge and appropriate medications and instructions that she verbalized understanding had no further questions at time of discharge    Problems Addressed:  Facial laceration, initial encounter: acute illness or injury  Fall, initial encounter: acute illness or injury  Hand laceration: acute illness or injury    Amount and/or Complexity of Data Reviewed  External Data Reviewed: notes.  Radiology: ordered. Decision-making details documented in ED Course.    Risk  OTC drugs.  Prescription drug management.             Medications   acetaminophen (TYLENOL) tablet 975 mg (975 mg Oral Given 10/4/24 1828)   ibuprofen (MOTRIN) tablet 600 mg (600 mg Oral Given 10/4/24 1828)   lidocaine-epinephrine (XYLOCAINE/EPINEPHRINE) 1 %-1:100,000 injection 20 mL (20 mL Infiltration Given 10/4/24 1852)   bacitracin topical ointment 1 small application (1 small application Topical Given 10/4/24 1852)   tetanus-diphtheria-acellular pertussis (BOOSTRIX) IM injection 0.5 mL (0.5 mL Intramuscular Given 10/4/24 2022)       ED Risk Strat Scores                                               History of Present Illness       Chief Complaint   Patient presents with    Fall     Was washing car, tripped over something in pavement and went forward hitting head on pavement. No loc no thinners. No neck discomfort. Cut to hand as well as forhead       Past Medical History:   Diagnosis Date    Anxiety     Psychiatric disorder       Past Surgical History:   Procedure Laterality Date     SECTION      HIP SURGERY        History reviewed.  No pertinent family history.   Social History     Tobacco Use    Smoking status: Never    Smokeless tobacco: Never   Vaping Use    Vaping status: Never Used   Substance Use Topics    Alcohol use: No    Drug use: No      E-Cigarette/Vaping    E-Cigarette Use Never User       E-Cigarette/Vaping Substances      I have reviewed and agree with the history as documented.     59-year-old female presents with a facial laceration and right hand injury after she fell onto the pavement and she smacked her head off of her pain when she did not lose consciousness has a headache denies any neck pain back pain no other injuries or complaints ambulated at the event not on blood thinners.      History provided by:  Patient   used: No        Review of Systems   Constitutional: Negative.         Headache wound   HENT: Negative.     Eyes: Negative.    Respiratory: Negative.     Cardiovascular: Negative.    Gastrointestinal: Negative.    Endocrine: Negative.    Genitourinary: Negative.    Musculoskeletal: Negative.    Skin:  Positive for wound.   Allergic/Immunologic: Negative.    Neurological:  Positive for headaches.   Hematological: Negative.    Psychiatric/Behavioral: Negative.     All other systems reviewed and are negative.          Objective       ED Triage Vitals [10/04/24 1652]   Temperature Pulse Blood Pressure Respirations SpO2 Patient Position - Orthostatic VS   98 °F (36.7 °C) 100 (!) 190/107 22 98 % Sitting      Temp Source Heart Rate Source BP Location FiO2 (%) Pain Score    Tympanic Apical Right arm -- 7      Vitals      Date and Time Temp Pulse SpO2 Resp BP Pain Score FACES Pain Rating User   10/04/24 1652 98 °F (36.7 °C) 100 98 % 22 190/107 7 -- LS            Physical Exam  Vitals and nursing note reviewed.   Constitutional:       Appearance: Normal appearance.   HENT:      Head: Normocephalic and atraumatic.      Comments: Vertical 4 cm laceration noted on the forehead.  Superficial.       Nose:  Nose normal.      Mouth/Throat:      Mouth: Mucous membranes are moist.   Eyes:      Extraocular Movements: Extraocular movements intact.      Pupils: Pupils are equal, round, and reactive to light.   Cardiovascular:      Rate and Rhythm: Normal rate and regular rhythm.   Pulmonary:      Effort: Pulmonary effort is normal.      Breath sounds: Normal breath sounds.   Abdominal:      General: Abdomen is flat. Bowel sounds are normal.      Palpations: Abdomen is soft.   Musculoskeletal:         General: Normal range of motion.      Cervical back: Normal range of motion and neck supple.      Comments: Right hand: 2 cm laceration noted ulnar aspect of the left palm below the fifth digit.   Skin:     General: Skin is warm.      Capillary Refill: Capillary refill takes less than 2 seconds.   Neurological:      General: No focal deficit present.      Mental Status: She is alert and oriented to person, place, and time. Mental status is at baseline.   Psychiatric:         Mood and Affect: Mood normal.         Thought Content: Thought content normal.         Results Reviewed       None            XR hand 3+ views LEFT   Final Interpretation by Elías Ann MD (10/04 2053)      No acute osseous abnormality.         Computerized Assisted Algorithm (CAA) may have been used to analyze all applicable images.         Workstation performed: [id number]         XR elbow 3+ vw RIGHT   Final Interpretation by Elías Ann MD (10/04 2056)      No acute osseous abnormality.         Computerized Assisted Algorithm (CAA) may have been used to analyze all applicable images.         Workstation performed: [id number]         CT head without contrast   Final Interpretation by Doc Souza MD (10/04 1830)      No acute intracranial abnormality.      Frontal scalp soft tissue swelling/bruising.                  Workstation performed: [id number]         CT spine cervical without contrast   Final Interpretation by Doc Souza MD  (10/04 1831)      No cervical spine fracture or traumatic malalignment.                  Workstation performed: [id number]         CT facial bones without contrast   Final Interpretation by Doc Souza MD (10/04 1839)      No acute maxillofacial fractures identified. See comments above for full analysis.               Workstation performed: [id number]             Universal Protocol:  Procedure performed by:  Consent: Verbal consent obtained.  Risks and benefits: risks, benefits and alternatives were discussed  Consent given by: patient  Patient identity confirmed: verbally with patient  Laceration repair    Date/Time: 10/4/2024 8:58 PM    Performed by: Rut Roth DO  Authorized by: Rut Roth DO  Body area: upper extremity  Location details: right hand  Laceration length: 3 cm  Anesthesia: local infiltration    Anesthesia:  Local Anesthetic: lidocaine 1% with epinephrine    Wound Dehiscence:  Superficial Wound Dehiscence: simple closure      Procedure Details:  Irrigation solution: saline  Amount of cleaning: extensive  Skin closure: 4-0 nylon  Technique: simple  Approximation: close  Approximation difficulty: simple  Dressing: 4x4 sterile gauze and antibiotic ointment  Patient tolerance: patient tolerated the procedure well with no immediate complications      Universal Protocol:  Procedure performed by:  Consent: Verbal consent obtained.  Risks and benefits: risks, benefits and alternatives were discussed  Consent given by: patient  Patient identity confirmed: verbally with patient  Laceration repair    Date/Time: 10/4/2024 8:59 PM    Performed by: Rut Roth DO  Authorized by: Rut Roth DO  Body area: head/neck  Location details: forehead  Anesthesia: local infiltration    Anesthesia:  Local Anesthetic: lidocaine 1% with epinephrine    Sedation:  Patient sedated: no      Wound Dehiscence:  Superficial Wound Dehiscence: simple closure      Procedure Details:  Irrigation solution: saline  Skin  closure: 4-0 nylon  Technique: simple  Approximation: close  Approximation difficulty: simple  Dressing: 4x4 sterile gauze and antibiotic ointment  Patient tolerance: patient tolerated the procedure well with no immediate complications          ED Medication and Procedure Management   Prior to Admission Medications   Prescriptions Last Dose Informant Patient Reported? Taking?   venlafaxine (EFFEXOR-XR) 150 mg 24 hr capsule   Yes No   Sig: Take 150 mg by mouth daily      Facility-Administered Medications: None     Discharge Medication List as of 10/4/2024  8:19 PM        START taking these medications    Details   bacitracin topical ointment 500 units/g topical ointment Apply 1 large application topically 2 (two) times a day, Starting Fri 10/4/2024, Normal      cephalexin (KEFLEX) 500 mg capsule Take 1 capsule (500 mg total) by mouth every 12 (twelve) hours for 7 days, Starting Fri 10/4/2024, Until Fri 10/11/2024, Normal           CONTINUE these medications which have NOT CHANGED    Details   venlafaxine (EFFEXOR-XR) 150 mg 24 hr capsule Take 150 mg by mouth daily, Historical Med           No discharge procedures on file.  ED SEPSIS DOCUMENTATION   Time reflects when diagnosis was documented in both MDM as applicable and the Disposition within this note       Time User Action Codes Description Comment    10/4/2024  8:18 PM Rut Roth [W19.XXXA] Fall, initial encounter     10/4/2024  8:18 PM Rut Roth [S01.81XA] Facial laceration, initial encounter     10/4/2024  8:18 PM Rut Roth [S61.419A] Hand laceration                  Rut Roth, DO  10/04/24 2100

## 2024-10-05 NOTE — DISCHARGE INSTRUCTIONS
pLease keep the wound clean keep it covered apply antibiotic ointment on there twice a day also take the Keflex twice a day for 7 days return to the ED for suture removal if you do notice redness swelling pus please come back immediately.